# Patient Record
Sex: FEMALE | Race: OTHER | HISPANIC OR LATINO | Employment: FULL TIME | URBAN - METROPOLITAN AREA
[De-identification: names, ages, dates, MRNs, and addresses within clinical notes are randomized per-mention and may not be internally consistent; named-entity substitution may affect disease eponyms.]

---

## 2015-07-27 LAB — EXTERNAL HIV SCREEN: NORMAL

## 2017-03-05 ENCOUNTER — HOSPITAL ENCOUNTER (EMERGENCY)
Facility: HOSPITAL | Age: 27
Discharge: HOME | End: 2017-03-05
Attending: EMERGENCY MEDICINE | Admitting: EMERGENCY MEDICINE
Payer: COMMERCIAL

## 2017-03-05 VITALS
RESPIRATION RATE: 18 BRPM | HEART RATE: 90 BPM | SYSTOLIC BLOOD PRESSURE: 138 MMHG | DIASTOLIC BLOOD PRESSURE: 82 MMHG | TEMPERATURE: 98.2 F | WEIGHT: 175 LBS | OXYGEN SATURATION: 99 %

## 2017-03-05 DIAGNOSIS — N39.0 URINARY TRACT INFECTION: Primary | ICD-10-CM

## 2017-03-05 LAB
BACTERIA UR QL AUTO: ABNORMAL /HPF
BILIRUB UR QL STRIP: NEGATIVE
CLARITY UR: ABNORMAL
CLARITY, POC: ABNORMAL
COLOR UR: ABNORMAL
COLOR, POC: ABNORMAL
GLUCOSE UR STRIP-MCNC: NEGATIVE MG/DL
HCG UR QL: NEGATIVE
HGB UR QL STRIP.AUTO: ABNORMAL
KETONES UR STRIP-MCNC: ABNORMAL MG/DL
LEUKOCYTE ESTERASE UR QL STRIP: ABNORMAL
NITRITE UR QL STRIP: POSITIVE
NON-SQ EPI CELLS URNS QL MICRO: ABNORMAL /HPF
PH UR STRIP.AUTO: 5.5 [PH] (ref 4.5–8)
PROT UR STRIP-MCNC: ABNORMAL MG/DL
RBC #/AREA URNS AUTO: ABNORMAL /HPF
SP GR UR STRIP.AUTO: >=1.03 (ref 1–1.03)
UROBILINOGEN UR QL STRIP.AUTO: 0.2 E.U./DL
WBC #/AREA URNS AUTO: ABNORMAL /HPF

## 2017-03-05 PROCEDURE — 87186 SC STD MICRODIL/AGAR DIL: CPT

## 2017-03-05 PROCEDURE — 87077 CULTURE AEROBIC IDENTIFY: CPT

## 2017-03-05 PROCEDURE — 81001 URINALYSIS AUTO W/SCOPE: CPT

## 2017-03-05 PROCEDURE — 81025 URINE PREGNANCY TEST: CPT | Performed by: EMERGENCY MEDICINE

## 2017-03-05 PROCEDURE — 87086 URINE CULTURE/COLONY COUNT: CPT

## 2017-03-05 PROCEDURE — 81002 URINALYSIS NONAUTO W/O SCOPE: CPT | Performed by: EMERGENCY MEDICINE

## 2017-03-05 PROCEDURE — 99283 EMERGENCY DEPT VISIT LOW MDM: CPT

## 2017-03-05 RX ORDER — PHENAZOPYRIDINE HYDROCHLORIDE 95 MG/1
95 TABLET ORAL 3 TIMES DAILY PRN
Qty: 6 TABLET | Refills: 0 | Status: SHIPPED | OUTPATIENT
Start: 2017-03-05 | End: 2017-03-07

## 2017-03-05 RX ORDER — SULFAMETHOXAZOLE AND TRIMETHOPRIM 800; 160 MG/1; MG/1
1 TABLET ORAL 2 TIMES DAILY
Qty: 6 TABLET | Refills: 0 | Status: SHIPPED | OUTPATIENT
Start: 2017-03-05 | End: 2017-03-08

## 2017-03-07 LAB — BACTERIA UR CULT: NORMAL

## 2019-01-12 ENCOUNTER — HOSPITAL ENCOUNTER (EMERGENCY)
Facility: HOSPITAL | Age: 29
Discharge: HOME | End: 2019-01-12
Attending: EMERGENCY MEDICINE | Admitting: EMERGENCY MEDICINE
Payer: COMMERCIAL

## 2019-01-12 VITALS
OXYGEN SATURATION: 99 % | RESPIRATION RATE: 18 BRPM | HEART RATE: 89 BPM | SYSTOLIC BLOOD PRESSURE: 136 MMHG | WEIGHT: 190 LBS | BODY MASS INDEX: 35.87 KG/M2 | TEMPERATURE: 99.1 F | DIASTOLIC BLOOD PRESSURE: 79 MMHG | HEIGHT: 61 IN

## 2019-01-12 DIAGNOSIS — J02.0 STREP THROAT: Primary | ICD-10-CM

## 2019-01-12 PROCEDURE — 99282 EMERGENCY DEPT VISIT SF MDM: CPT

## 2019-01-12 RX ORDER — MEDROXYPROGESTERONE ACETATE 10 MG/1
10 TABLET ORAL
COMMUNITY
Start: 2018-11-09 | End: 2022-04-20 | Stop reason: ALTCHOICE

## 2019-01-12 RX ORDER — FLUCONAZOLE 200 MG/1
200 TABLET ORAL DAILY
Qty: 7 TABLET | Refills: 0 | Status: SHIPPED | OUTPATIENT
Start: 2019-01-12 | End: 2019-01-19

## 2019-01-12 RX ORDER — PENICILLIN V POTASSIUM 500 MG/1
500 TABLET ORAL 4 TIMES DAILY
Qty: 40 TABLET | Refills: 0 | Status: SHIPPED | OUTPATIENT
Start: 2019-01-12 | End: 2019-01-22

## 2019-01-12 RX ORDER — PENICILLIN V POTASSIUM 250 MG/1
500 TABLET ORAL ONCE
Status: COMPLETED | OUTPATIENT
Start: 2019-01-12 | End: 2019-01-12

## 2019-01-12 RX ORDER — ONDANSETRON 4 MG/1
4 TABLET, ORALLY DISINTEGRATING ORAL ONCE
Status: COMPLETED | OUTPATIENT
Start: 2019-01-12 | End: 2019-01-12

## 2019-01-12 RX ADMIN — PENICILLIN V POTASIUM 500 MG: 250 TABLET ORAL at 02:45

## 2019-01-12 RX ADMIN — ONDANSETRON 4 MG: 4 TABLET, ORALLY DISINTEGRATING ORAL at 02:56

## 2019-01-12 RX ADMIN — DEXAMETHASONE SODIUM PHOSPHATE 10 MG: 10 INJECTION, SOLUTION INTRAMUSCULAR; INTRAVENOUS at 02:45

## 2019-01-12 NOTE — ED PROVIDER NOTES
History  Chief Complaint   Patient presents with    Sore Throat     sore throat, started friday morning  Took tylenol at "[employer]  Previously healthy    Yesterday morning with ear ache, sore throat, now with pressure  no vomiting despite nausea - fever 101 5, No cough  "Feels like strep throat"   Nursing home exposure, no known sick contacts  Tylenol 3PM and 7:20, each 500mg - zero relief but brought down fever  Now with pressure behind the eyes and top of the head tightness/aching   bothered by light, no phonophobia, no neck stiffness, no vision change    Sexually active men condoms every time  No alcohol, no cigarettes, no drugs    Last period around xmas, always irregular  Provera for irregular periods                  Prior to Admission Medications   Prescriptions Last Dose Informant Patient Reported? Taking? medroxyPROGESTERone (PROVERA) 10 mg tablet   Yes Yes   Sig: 10 mg      Facility-Administered Medications: None       Past Medical History:   Diagnosis Date    UTI (urinary tract infection)        History reviewed  No pertinent surgical history  Family History   Problem Relation Age of Onset    Hypertension Mother     Hypertension Father      I have reviewed and agree with the history as documented  Social History   Substance Use Topics    Smoking status: Never Smoker    Smokeless tobacco: Never Used    Alcohol use No        Review of Systems   Constitutional: Positive for fever  Negative for activity change, appetite change, chills, diaphoresis and fatigue  HENT: Positive for ear pain, sore throat, trouble swallowing and voice change  Negative for congestion, rhinorrhea, sinus pain, sinus pressure and sneezing  Eyes: Negative for visual disturbance  Respiratory: Negative for choking, chest tightness, shortness of breath, wheezing and stridor  Cardiovascular: Negative for chest pain, palpitations and leg swelling  Gastrointestinal: Positive for nausea   Negative for abdominal distention, abdominal pain, blood in stool and vomiting  Endocrine: Negative for polyuria  Genitourinary: Negative for difficulty urinating, dyspareunia, dysuria, enuresis, flank pain, frequency, hematuria and vaginal bleeding  Musculoskeletal: Negative for arthralgias, gait problem, neck pain and neck stiffness  Skin: Positive for rash  Negative for color change  Allergic/Immunologic: Negative for food allergies  Neurological: Negative for dizziness, tremors, speech difficulty, weakness, light-headedness and headaches  Hematological: Positive for adenopathy  Psychiatric/Behavioral: Negative for confusion  The patient is not nervous/anxious  Physical Exam  ED Triage Vitals [01/12/19 0200]   Temperature Pulse Respirations Blood Pressure SpO2   99 1 °F (37 3 °C) 92 18 136/79 99 %      Temp src Heart Rate Source Patient Position - Orthostatic VS BP Location FiO2 (%)   -- -- -- -- --      Pain Score       8           Orthostatic Vital Signs  Vitals:    01/12/19 0200 01/12/19 0202   BP: 136/79    Pulse: 92 89       Physical Exam   Constitutional: She is oriented to person, place, and time  She appears well-developed and well-nourished  No distress  HENT:   Head: Normocephalic and atraumatic  Right Ear: External ear normal    Left Ear: External ear normal    Nose: Nose normal    Tonsillar exudate bilateral    Tender adenopathy   Eyes: Pupils are equal, round, and reactive to light  Conjunctivae and EOM are normal  No scleral icterus  Neck: Normal range of motion  Neck supple  Cardiovascular: Normal rate, regular rhythm, normal heart sounds and intact distal pulses  No murmur heard  Pulmonary/Chest: Effort normal and breath sounds normal  No stridor  No respiratory distress  She has no wheezes  She has no rales  Abdominal: Soft  Bowel sounds are normal  She exhibits no distension and no mass  There is no tenderness  There is no rebound and no guarding     Musculoskeletal: Normal range of motion  She exhibits no edema, tenderness or deformity  Lymphadenopathy:     She has cervical adenopathy  Neurological: She is alert and oriented to person, place, and time  No cranial nerve deficit or sensory deficit  Skin: Skin is warm and dry  Capillary refill takes less than 2 seconds  Rash (Left neck) noted  She is not diaphoretic  Psychiatric: She has a normal mood and affect  Her behavior is normal  Judgment and thought content normal    Nursing note and vitals reviewed  ED Medications  Medications   dexamethasone 10 mg/mL oral liquid 10 mg 1 mL (10 mg Oral Given 1/12/19 0245)   penicillin V potassium (VEETID) tablet 500 mg (500 mg Oral Given 1/12/19 0245)   ondansetron (ZOFRAN-ODT) dispersible tablet 4 mg (4 mg Oral Given 1/12/19 0256)       Diagnostic Studies  Results Reviewed     None                 No orders to display         Procedures  Procedures      Phone Consults  ED Phone Contact    ED Course                               MDM  Number of Diagnoses or Management Options  Strep throat:   Diagnosis management comments:  Patient with 4 centor criteria     10 days penicillin,  7 days Diflucan     1 dose corticosteroids in the ED   1 doses Zofran in the ED with symptom resolution    CritCare Time    Disposition  Final diagnoses:   Strep throat     Time reflects when diagnosis was documented in both MDM as applicable and the Disposition within this note     Time User Action Codes Description Comment    1/12/2019  3:01 AM Ananth Elena Add [J02 0] Strep throat       ED Disposition     ED Disposition Condition Comment    Discharge  Anisa Hard discharge to home/self care      Condition at discharge: Stable        Follow-up Information     Follow up With Specialties Details Raymundo Michael 70, SENDYNP Nurse Practitioner In 1 day As needed [phone]            Patient's Medications   Discharge Prescriptions    FLUCONAZOLE (DIFLUCAN) 200 MG TABLET    Take 1 tablet (200 mg total) by mouth daily for 7 days       Start Date: 1/12/2019 End Date: 1/19/2019       Order Dose: 200 mg       Quantity: 7 tablet    Refills: 0    PENICILLIN V POTASSIUM (VEETID) 500 MG TABLET    Take 1 tablet (500 mg total) by mouth 4 (four) times a day for 10 days       Start Date: 1/12/2019 End Date: 1/22/2019       Order Dose: 500 mg       Quantity: 40 tablet    Refills: 0     No discharge procedures on file  ED Provider  Attending physically available and evaluated Arben Blanco I managed the patient along with the ED Attending      Electronically Signed by         Stan Padilla MD  01/12/19 8607

## 2019-01-12 NOTE — DISCHARGE INSTRUCTIONS
Strep Throat, Ambulatory Care   GENERAL INFORMATION:   Strep throat  is a throat infection caused by bacteria  It is easily spread from person to person  Common symptoms include the following:   · Sore, red, and swollen throat    · Fever and headache     · Upset stomach, abdominal pain, or vomiting    · White or yellow patches or blisters in the back of your throat    · Tender, swollen lumps on the sides of your neck or jaw    · Throat pain when you swallow  Seek immediate care for the following symptoms:   · Throat pain that makes it too painful to drink fluids    · Drooling because you cannot swallow your spit    · Not able to open your mouth all the way or your voice is muffled    · Trouble breathing because your throat is swollen    · New symptoms like a bad headache, stiff neck, chest pain, or vomiting    · Blood in your urine and swollen face, feet, or hands  Treatment for strep throat:  You will need antibiotic medicine to treat your strep throat  Take your antibiotics until they are gone, even if you feel better  Do this unless your caregiver says it is okay to stop your antibiotics  You may return to work or school 24 hours after you start antibiotics  Manage strep throat:   · Do not smoke  If you smoke, it is never too late to quit  Smoking may make your symptoms worse  Ask for information if you need help quitting  · Drink juice, milk shakes, or soup  if your throat is too sore to eat solid food  Drinking liquids can also help prevent dehydration  · Gargle with salt water  Mix ¼ teaspoon salt in a glass of warm water and gargle  This may help reduce swelling in your throat  · Use lozenges, ice, soft foods, or popsicles  to soothe your throat    Prevent the spread of strep throat:   · Do not share food or drinks    · Wash your hands often    · Replace your toothbrush after you have taken antibiotics for 24 hours  Follow up with your healthcare provider as directed:  Write down your questions so you remember to ask them during your visits  CARE AGREEMENT:   You have the right to help plan your care  Learn about your health condition and how it may be treated  Discuss treatment options with your caregivers to decide what care you want to receive  You always have the right to refuse treatment  The above information is an  only  It is not intended as medical advice for individual conditions or treatments  Talk to your doctor, nurse or pharmacist before following any medical regimen to see if it is safe and effective for you  © 2014 [address] is for End User's use only and may not be sold, redistributed or otherwise used for commercial purposes  All illustrations and images included in CareNotes® are the copyrighted property of [employer]  or Tae Burdick

## 2019-01-12 NOTE — ED ATTENDING ATTESTATION
Rosales Mares MD, saw and evaluated the patient  I have discussed the patient with the resident/non-physician practitioner and agree with the resident's/non-physician practitioner's findings, Plan of Care, and MDM as documented in the resident's/non-physician practitioner's note, except where noted  All available labs and Radiology studies were reviewed  At this point I agree with the current assessment done in the Emergency Department  I have conducted an independent evaluation of this patient a history and physical is as follows:  1-2 days of sore throat and fever, fever to 101 at home  Patient with prior history of strep throat  Also complains of some tenderness in her neck radiating up into her ears which resulted in head congestion when she lies flat  No runny nose  Patient does not have cough  Patient does have change in her voice  Is otherwise healthy with no allergies and immunizations up-to-date  On exam patient has exudate of tonsillitis with tender adenopathy  She does not have significant trismus and there is no stridor  Her heart lung exams are normal   Impression:  Likely strep throat    Agree with documentation    Critical Care Time  CritCare Time    Procedures

## 2019-07-09 ENCOUNTER — OFFICE VISIT (OUTPATIENT)
Dept: FAMILY MEDICINE CLINIC | Facility: CLINIC | Age: 29
End: 2019-07-09
Payer: COMMERCIAL

## 2019-07-09 VITALS
BODY MASS INDEX: 35.89 KG/M2 | TEMPERATURE: 96.4 F | SYSTOLIC BLOOD PRESSURE: 118 MMHG | DIASTOLIC BLOOD PRESSURE: 90 MMHG | WEIGHT: 190.1 LBS | OXYGEN SATURATION: 96 % | HEIGHT: 61 IN | HEART RATE: 80 BPM

## 2019-07-09 DIAGNOSIS — R03.0 ELEVATED BLOOD-PRESSURE READING WITHOUT DIAGNOSIS OF HYPERTENSION: ICD-10-CM

## 2019-07-09 DIAGNOSIS — E66.09 CLASS 2 OBESITY DUE TO EXCESS CALORIES WITHOUT SERIOUS COMORBIDITY WITH BODY MASS INDEX (BMI) OF 35.0 TO 35.9 IN ADULT: ICD-10-CM

## 2019-07-09 DIAGNOSIS — F32.2 CURRENT SEVERE EPISODE OF MAJOR DEPRESSIVE DISORDER WITHOUT PSYCHOTIC FEATURES WITHOUT PRIOR EPISODE (HCC): Primary | ICD-10-CM

## 2019-07-09 DIAGNOSIS — E55.9 VITAMIN D DEFICIENCY: ICD-10-CM

## 2019-07-09 PROBLEM — R93.89 ULTRASOUND SCAN ABNORMAL: Status: ACTIVE | Noted: 2017-12-29

## 2019-07-09 PROCEDURE — 99203 OFFICE O/P NEW LOW 30 MIN: CPT | Performed by: NURSE PRACTITIONER

## 2019-07-09 RX ORDER — SERTRALINE HYDROCHLORIDE 25 MG/1
25 TABLET, FILM COATED ORAL
Qty: 30 TABLET | Refills: 1 | Status: SHIPPED | OUTPATIENT
Start: 2019-07-09 | End: 2019-07-30 | Stop reason: SDUPTHER

## 2019-07-09 NOTE — PROGRESS NOTES
Subjective:   Chief Complaint   Patient presents with    Allergies     right itchy, watery    Dizziness        Patient ID: Guillermo Cagle is a 29 y o  female  Presents today to reestablish care her last visit was October of 2015  She is also expressing concerns over increasing depression  While she has had some suicidal thoughts she has not developed up plan or thought a of a specific method  The following portions of the patient's history were reviewed and updated as appropriate: allergies, current medications, past family history, past medical history, past social history, past surgical history and problem list     Review of Systems   Constitutional: Positive for fatigue  Negative for chills and fever  Respiratory: Negative for cough, shortness of breath and wheezing  Cardiovascular: Negative for chest pain, palpitations and leg swelling  Psychiatric/Behavioral: Positive for dysphoric mood and suicidal ideas (without Plan or method)  Negative for agitation, decreased concentration, hallucinations and self-injury  The patient is nervous/anxious  Objective:  Vitals:    07/09/19 1102   BP: 118/90   BP Location: Left arm   Patient Position: Sitting   Cuff Size: Adult   Pulse: 80   Temp: (!) 96 4 °F (35 8 °C)   TempSrc: Temporal   SpO2: 96%   Weight: 86 2 kg (190 lb 1 6 oz)   Height: 5' 1" (1 549 m)      Physical Exam   Constitutional: She is oriented to person, place, and time  She appears well-developed and well-nourished  Eyes: Pupils are equal, round, and reactive to light  Conjunctivae and EOM are normal    Cardiovascular: Normal rate, regular rhythm, normal heart sounds and intact distal pulses  Pulmonary/Chest: Effort normal and breath sounds normal    Neurological: She is alert and oriented to person, place, and time  Skin: Skin is warm and dry  Psychiatric: Her speech is normal  She is withdrawn  Cognition and memory are normal  She exhibits a depressed mood   She expresses suicidal (has thought about it but no plan or method) ideation  She expresses no suicidal plans  Assessment/Plan:    No problem-specific Assessment & Plan notes found for this encounter  Diagnoses and all orders for this visit:    Current severe episode of major depressive disorder without psychotic features without prior episode (HCC)  -     sertraline (ZOLOFT) 25 mg tablet; Take 1 tablet (25 mg total) by mouth daily at bedtime  -     Ambulatory referral to Psychology; Future    Elevated blood-pressure reading without diagnosis of hypertension  -     CBC and differential; Future  -     Comprehensive metabolic panel; Future    Class 2 obesity due to excess calories without serious comorbidity with body mass index (BMI) of 35 0 to 35 9 in adult  -     Lipid panel; Future  -     TSH, 3rd generation with Free T4 reflex; Future    Vitamin D deficiency  -     Vitamin D 25 hydroxy;  Future

## 2019-07-09 NOTE — PATIENT INSTRUCTIONS
Depression   AMBULATORY CARE:   Depression  is a medical condition that causes feelings of sadness or hopelessness that do not go away  Depression may cause you to lose interest in things you used to enjoy  These feelings may interfere with your daily life  Common symptoms include the following:   · Appetite changes, or weight gain or loss    · Trouble going to sleep or staying asleep, or sleeping too much    · Fatigue or lack of energy    · Feeling restless, irritable, or withdrawn    · Feeling worthless, hopeless, discouraged, or guilty    · Trouble concentrating, remembering things, doing daily tasks, or making decisions    · Thoughts about hurting or killing yourself  Call 911 for any of the following:   · You think about harming yourself or someone else  Contact your healthcare provider if:   · Your symptoms do not improve  · You cannot make it to your next appointment  · You have new symptoms  · You have questions or concerns about your condition or care  Treatment for depression  may include medicine to improve or balance your mood  Therapy may also be used to treat your depression  A therapist will help you learn to cope with your thoughts and feelings  Therapy can be done alone or in a group  It may also be done with family members or a significant other  Self-care:   · Get regular physical activity  Try to exercise for 30 minutes, 3 to 5 days a week  Work with your healthcare provider to develop an exercise plan that you enjoy  Physical activity may improve your symptoms  · Get enough sleep  Create a routine to help you relax before bed  You can listen to music, read, or do yoga  Try to go to bed and wake up at the same time every day  Sleep is important for emotional health  · Eat a variety of healthy foods from all of the food groups  A healthy meal plan is low in fat, salt, and added sugar   Ask your healthcare provider for more information about a meal plan that is right for you      · Do not drink alcohol or use drugs  Alcohol and drugs can make your symptoms worse  Follow up with your healthcare provider as directed: Your healthcare provider will monitor your progress at follow-up visits  He or she will also monitor your medicine if you take antidepressants  Your healthcare provider will ask if the medicine is helping  Tell him or her about any side effects or problems you may have with your medicine  The type or amount of medicine may need to be changed  Write down your questions so you remember to ask them during your visits  © 2017 [address] Information is for End User's use only and may not be sold, redistributed or otherwise used for commercial purposes  All illustrations and images included in CareNotes® are the copyrighted property of A [employer]  or Tae Burdick  The above information is an  only  It is not intended as medical advice for individual conditions or treatments  Talk to your doctor, nurse or pharmacist before following any medical regimen to see if it is safe and effective for you

## 2019-07-19 ENCOUNTER — APPOINTMENT (OUTPATIENT)
Dept: LAB | Facility: HOSPITAL | Age: 29
End: 2019-07-19
Payer: COMMERCIAL

## 2019-07-19 DIAGNOSIS — E55.9 VITAMIN D DEFICIENCY: ICD-10-CM

## 2019-07-19 DIAGNOSIS — E66.09 CLASS 2 OBESITY DUE TO EXCESS CALORIES WITHOUT SERIOUS COMORBIDITY WITH BODY MASS INDEX (BMI) OF 35.0 TO 35.9 IN ADULT: ICD-10-CM

## 2019-07-19 DIAGNOSIS — R03.0 ELEVATED BLOOD-PRESSURE READING WITHOUT DIAGNOSIS OF HYPERTENSION: ICD-10-CM

## 2019-07-19 LAB
25(OH)D3 SERPL-MCNC: 19.8 NG/ML (ref 30–100)
ALBUMIN SERPL BCP-MCNC: 3.2 G/DL (ref 3.5–5)
ALP SERPL-CCNC: 65 U/L (ref 46–116)
ALT SERPL W P-5'-P-CCNC: 17 U/L (ref 12–78)
ANION GAP SERPL CALCULATED.3IONS-SCNC: 3 MMOL/L (ref 4–13)
AST SERPL W P-5'-P-CCNC: 12 U/L (ref 5–45)
BASOPHILS # BLD AUTO: 0.09 THOUSANDS/ΜL (ref 0–0.1)
BASOPHILS NFR BLD AUTO: 1 % (ref 0–1)
BILIRUB SERPL-MCNC: 0.57 MG/DL (ref 0.2–1)
BUN SERPL-MCNC: 11 MG/DL (ref 5–25)
CALCIUM SERPL-MCNC: 8.6 MG/DL (ref 8.3–10.1)
CHLORIDE SERPL-SCNC: 110 MMOL/L (ref 100–108)
CHOLEST SERPL-MCNC: 144 MG/DL (ref 50–200)
CO2 SERPL-SCNC: 26 MMOL/L (ref 21–32)
CREAT SERPL-MCNC: 0.71 MG/DL (ref 0.6–1.3)
EOSINOPHIL # BLD AUTO: 0.14 THOUSAND/ΜL (ref 0–0.61)
EOSINOPHIL NFR BLD AUTO: 2 % (ref 0–6)
ERYTHROCYTE [DISTWIDTH] IN BLOOD BY AUTOMATED COUNT: 12 % (ref 11.6–15.1)
GFR SERPL CREATININE-BSD FRML MDRD: 116 ML/MIN/1.73SQ M
GLUCOSE P FAST SERPL-MCNC: 91 MG/DL (ref 65–99)
HCT VFR BLD AUTO: 43.6 % (ref 34.8–46.1)
HDLC SERPL-MCNC: 46 MG/DL (ref 40–60)
HGB BLD-MCNC: 14.4 G/DL (ref 11.5–15.4)
IMM GRANULOCYTES # BLD AUTO: 0.03 THOUSAND/UL (ref 0–0.2)
IMM GRANULOCYTES NFR BLD AUTO: 1 % (ref 0–2)
LDLC SERPL CALC-MCNC: 75 MG/DL (ref 0–100)
LYMPHOCYTES # BLD AUTO: 1.56 THOUSANDS/ΜL (ref 0.6–4.47)
LYMPHOCYTES NFR BLD AUTO: 24 % (ref 14–44)
MCH RBC QN AUTO: 30.4 PG (ref 26.8–34.3)
MCHC RBC AUTO-ENTMCNC: 33 G/DL (ref 31.4–37.4)
MCV RBC AUTO: 92 FL (ref 82–98)
MONOCYTES # BLD AUTO: 0.44 THOUSAND/ΜL (ref 0.17–1.22)
MONOCYTES NFR BLD AUTO: 7 % (ref 4–12)
NEUTROPHILS # BLD AUTO: 4.35 THOUSANDS/ΜL (ref 1.85–7.62)
NEUTS SEG NFR BLD AUTO: 65 % (ref 43–75)
NONHDLC SERPL-MCNC: 98 MG/DL
NRBC BLD AUTO-RTO: 0 /100 WBCS
PLATELET # BLD AUTO: 327 THOUSANDS/UL (ref 149–390)
PMV BLD AUTO: 12 FL (ref 8.9–12.7)
POTASSIUM SERPL-SCNC: 3.7 MMOL/L (ref 3.5–5.3)
PROT SERPL-MCNC: 6.9 G/DL (ref 6.4–8.2)
RBC # BLD AUTO: 4.74 MILLION/UL (ref 3.81–5.12)
SODIUM SERPL-SCNC: 139 MMOL/L (ref 136–145)
TRIGL SERPL-MCNC: 117 MG/DL
TSH SERPL DL<=0.05 MIU/L-ACNC: 0.69 UIU/ML (ref 0.36–3.74)
WBC # BLD AUTO: 6.61 THOUSAND/UL (ref 4.31–10.16)

## 2019-07-19 PROCEDURE — 36415 COLL VENOUS BLD VENIPUNCTURE: CPT

## 2019-07-19 PROCEDURE — 85025 COMPLETE CBC W/AUTO DIFF WBC: CPT

## 2019-07-19 PROCEDURE — 84443 ASSAY THYROID STIM HORMONE: CPT

## 2019-07-19 PROCEDURE — 80061 LIPID PANEL: CPT

## 2019-07-19 PROCEDURE — 80053 COMPREHEN METABOLIC PANEL: CPT

## 2019-07-19 PROCEDURE — 82306 VITAMIN D 25 HYDROXY: CPT

## 2019-07-29 RX ORDER — SUMATRIPTAN 25 MG/1
TABLET, FILM COATED ORAL
COMMUNITY
Start: 2015-10-07 | End: 2020-10-27 | Stop reason: SDUPTHER

## 2019-07-30 ENCOUNTER — OFFICE VISIT (OUTPATIENT)
Dept: FAMILY MEDICINE CLINIC | Facility: CLINIC | Age: 29
End: 2019-07-30
Payer: COMMERCIAL

## 2019-07-30 VITALS
BODY MASS INDEX: 36.97 KG/M2 | HEART RATE: 91 BPM | TEMPERATURE: 97.5 F | DIASTOLIC BLOOD PRESSURE: 78 MMHG | HEIGHT: 61 IN | OXYGEN SATURATION: 98 % | RESPIRATION RATE: 18 BRPM | WEIGHT: 195.8 LBS | SYSTOLIC BLOOD PRESSURE: 122 MMHG

## 2019-07-30 DIAGNOSIS — F32.2 CURRENT SEVERE EPISODE OF MAJOR DEPRESSIVE DISORDER WITHOUT PSYCHOTIC FEATURES WITHOUT PRIOR EPISODE (HCC): Primary | ICD-10-CM

## 2019-07-30 DIAGNOSIS — F32.2 CURRENT SEVERE EPISODE OF MAJOR DEPRESSIVE DISORDER WITHOUT PSYCHOTIC FEATURES WITHOUT PRIOR EPISODE (HCC): ICD-10-CM

## 2019-07-30 PROBLEM — F32.A DEPRESSION: Status: ACTIVE | Noted: 2019-07-30

## 2019-07-30 PROCEDURE — 3008F BODY MASS INDEX DOCD: CPT | Performed by: NURSE PRACTITIONER

## 2019-07-30 PROCEDURE — 99213 OFFICE O/P EST LOW 20 MIN: CPT | Performed by: NURSE PRACTITIONER

## 2019-07-30 PROCEDURE — 1036F TOBACCO NON-USER: CPT | Performed by: NURSE PRACTITIONER

## 2019-07-30 RX ORDER — SERTRALINE HYDROCHLORIDE 25 MG/1
25 TABLET, FILM COATED ORAL
Qty: 30 TABLET | Refills: 3 | Status: SHIPPED | OUTPATIENT
Start: 2019-07-30 | End: 2020-10-27

## 2019-07-30 NOTE — PROGRESS NOTES
Subjective:   Chief Complaint   Patient presents with    Follow-up     Depression         Patient ID: Micah Real is a 29 y o  female  Presents today for 3 week follow-up for depression  She has yet to be able to find a therapist but has made phone calls to words that end  States she has been feeling well engaging in normal activities as well as socializing with family and friends  Her mother has remarked that she seems more like herself these days  The Zoloft is working well and will continue  Encouraged to find therapist to help as well  Cut      The following portions of the patient's history were reviewed and updated as appropriate: allergies, current medications, past family history, past medical history, past social history, past surgical history and problem list     Review of Systems   Constitutional: Negative for chills, fatigue and fever  Respiratory: Negative for cough and shortness of breath  Cardiovascular: Negative for chest pain and palpitations  Psychiatric/Behavioral: Negative for decreased concentration, dysphoric mood, self-injury and suicidal ideas  The patient is not nervous/anxious  Objective:  Vitals:    07/30/19 1546   BP: 122/78   BP Location: Left arm   Patient Position: Sitting   Cuff Size: Large   Pulse: 91   Resp: 18   Temp: 97 5 °F (36 4 °C)   TempSrc: Temporal   SpO2: 98%   Weight: 88 8 kg (195 lb 12 8 oz)   Height: 5' 1" (1 549 m)      Physical Exam   Constitutional: She is oriented to person, place, and time  She appears well-developed and well-nourished  Neck: Normal range of motion  Neck supple  Cardiovascular: Normal rate, regular rhythm, normal heart sounds and intact distal pulses  Pulmonary/Chest: Effort normal and breath sounds normal    Lymphadenopathy:     She has no cervical adenopathy  Neurological: She is alert and oriented to person, place, and time  Skin: Skin is warm and dry  Psychiatric: She has a normal mood and affect  Her speech is normal and behavior is normal  Thought content normal          Assessment/Plan:    No problem-specific Assessment & Plan notes found for this encounter  Diagnoses and all orders for this visit:    Current severe episode of major depressive disorder without psychotic features without prior episode ([facility] [address] )  Comments:  Continue on current dose of Zoloft continue to try to get a therapy appointment  Orders:  -     sertraline (ZOLOFT) 25 mg tablet; Take 1 tablet (25 mg total) by mouth daily at bedtime    Current severe episode of major depressive disorder without psychotic features without prior episode ([facility])  -     sertraline (ZOLOFT) 25 mg tablet;  Take 1 tablet (25 mg total) by mouth daily at bedtime

## 2020-04-08 ENCOUNTER — TELEMEDICINE (OUTPATIENT)
Dept: FAMILY MEDICINE CLINIC | Facility: CLINIC | Age: 30
End: 2020-04-08
Payer: COMMERCIAL

## 2020-04-08 ENCOUNTER — TELEPHONE (OUTPATIENT)
Dept: FAMILY MEDICINE CLINIC | Facility: CLINIC | Age: 30
End: 2020-04-08

## 2020-04-08 VITALS — TEMPERATURE: 99.6 F

## 2020-04-08 DIAGNOSIS — Z20.828 EXPOSURE TO SARS-ASSOCIATED CORONAVIRUS: ICD-10-CM

## 2020-04-08 DIAGNOSIS — Z20.822 EXPOSURE TO COVID-19 VIRUS: ICD-10-CM

## 2020-04-08 DIAGNOSIS — Z20.828 EXPOSURE TO SARS-ASSOCIATED CORONAVIRUS: Primary | ICD-10-CM

## 2020-04-08 PROCEDURE — 99213 OFFICE O/P EST LOW 20 MIN: CPT | Performed by: NURSE PRACTITIONER

## 2020-04-08 PROCEDURE — 87635 SARS-COV-2 COVID-19 AMP PRB: CPT

## 2020-04-08 RX ORDER — METHOCARBAMOL 750 MG/1
50000 TABLET ORAL WEEKLY
COMMUNITY
Start: 2020-01-07 | End: 2022-04-20 | Stop reason: ALTCHOICE

## 2020-04-11 ENCOUNTER — TELEPHONE (OUTPATIENT)
Dept: FAMILY MEDICINE CLINIC | Facility: CLINIC | Age: 30
End: 2020-04-11

## 2020-04-11 LAB — SARS-COV-2 RNA SPEC QL NAA+PROBE: NOT DETECTED

## 2020-04-29 ENCOUNTER — TELEMEDICINE (OUTPATIENT)
Dept: FAMILY MEDICINE CLINIC | Facility: CLINIC | Age: 30
End: 2020-04-29
Payer: COMMERCIAL

## 2020-04-29 ENCOUNTER — TELEPHONE (OUTPATIENT)
Dept: FAMILY MEDICINE CLINIC | Facility: CLINIC | Age: 30
End: 2020-04-29

## 2020-04-29 DIAGNOSIS — Z20.828 EXPOSURE TO SARS-ASSOCIATED CORONAVIRUS: ICD-10-CM

## 2020-04-29 DIAGNOSIS — Z20.828 EXPOSURE TO SARS-ASSOCIATED CORONAVIRUS: Primary | ICD-10-CM

## 2020-04-29 PROCEDURE — U0003 INFECTIOUS AGENT DETECTION BY NUCLEIC ACID (DNA OR RNA); SEVERE ACUTE RESPIRATORY SYNDROME CORONAVIRUS 2 (SARS-COV-2) (CORONAVIRUS DISEASE [COVID-19]), AMPLIFIED PROBE TECHNIQUE, MAKING USE OF HIGH THROUGHPUT TECHNOLOGIES AS DESCRIBED BY CMS-2020-01-R: HCPCS

## 2020-04-29 PROCEDURE — 99213 OFFICE O/P EST LOW 20 MIN: CPT | Performed by: NURSE PRACTITIONER

## 2020-04-30 ENCOUNTER — TELEPHONE (OUTPATIENT)
Dept: FAMILY MEDICINE CLINIC | Facility: CLINIC | Age: 30
End: 2020-04-30

## 2020-04-30 ENCOUNTER — TELEMEDICINE (OUTPATIENT)
Dept: FAMILY MEDICINE CLINIC | Facility: CLINIC | Age: 30
End: 2020-04-30
Payer: COMMERCIAL

## 2020-04-30 VITALS — TEMPERATURE: 99.1 F

## 2020-04-30 DIAGNOSIS — U07.1 COVID-19 VIRUS INFECTION: Primary | ICD-10-CM

## 2020-04-30 LAB — SARS-COV-2 RNA SPEC QL NAA+PROBE: DETECTED

## 2020-04-30 PROCEDURE — 99213 OFFICE O/P EST LOW 20 MIN: CPT | Performed by: NURSE PRACTITIONER

## 2020-05-01 ENCOUNTER — TELEMEDICINE (OUTPATIENT)
Dept: FAMILY MEDICINE CLINIC | Facility: CLINIC | Age: 30
End: 2020-05-01
Payer: COMMERCIAL

## 2020-05-01 DIAGNOSIS — U07.1 COVID-19 VIRUS INFECTION: Primary | ICD-10-CM

## 2020-05-01 PROCEDURE — 99213 OFFICE O/P EST LOW 20 MIN: CPT | Performed by: NURSE PRACTITIONER

## 2020-05-02 ENCOUNTER — TELEMEDICINE (OUTPATIENT)
Dept: FAMILY MEDICINE CLINIC | Facility: CLINIC | Age: 30
End: 2020-05-02
Payer: COMMERCIAL

## 2020-05-02 DIAGNOSIS — U07.1 COVID-19 VIRUS INFECTION: Primary | ICD-10-CM

## 2020-05-02 PROCEDURE — 99213 OFFICE O/P EST LOW 20 MIN: CPT | Performed by: NURSE PRACTITIONER

## 2020-05-03 ENCOUNTER — TELEMEDICINE (OUTPATIENT)
Dept: FAMILY MEDICINE CLINIC | Facility: CLINIC | Age: 30
End: 2020-05-03
Payer: COMMERCIAL

## 2020-05-03 DIAGNOSIS — U07.1 COVID-19 VIRUS INFECTION: Primary | ICD-10-CM

## 2020-05-03 PROCEDURE — 99213 OFFICE O/P EST LOW 20 MIN: CPT | Performed by: NURSE PRACTITIONER

## 2020-05-04 ENCOUNTER — TELEMEDICINE (OUTPATIENT)
Dept: FAMILY MEDICINE CLINIC | Facility: CLINIC | Age: 30
End: 2020-05-04
Payer: COMMERCIAL

## 2020-05-04 VITALS — TEMPERATURE: 99.3 F

## 2020-05-04 DIAGNOSIS — U07.1 COVID-19 VIRUS INFECTION: ICD-10-CM

## 2020-05-04 PROCEDURE — 99213 OFFICE O/P EST LOW 20 MIN: CPT | Performed by: NURSE PRACTITIONER

## 2020-05-05 ENCOUNTER — TELEMEDICINE (OUTPATIENT)
Dept: FAMILY MEDICINE CLINIC | Facility: CLINIC | Age: 30
End: 2020-05-05
Payer: COMMERCIAL

## 2020-05-05 VITALS — TEMPERATURE: 99.8 F

## 2020-05-05 DIAGNOSIS — U07.1 COVID-19 VIRUS INFECTION: Primary | ICD-10-CM

## 2020-05-05 PROCEDURE — 99213 OFFICE O/P EST LOW 20 MIN: CPT | Performed by: NURSE PRACTITIONER

## 2020-05-06 ENCOUNTER — TELEMEDICINE (OUTPATIENT)
Dept: FAMILY MEDICINE CLINIC | Facility: CLINIC | Age: 30
End: 2020-05-06
Payer: COMMERCIAL

## 2020-05-06 DIAGNOSIS — U07.1 COVID-19 VIRUS INFECTION: ICD-10-CM

## 2020-05-06 PROCEDURE — 99213 OFFICE O/P EST LOW 20 MIN: CPT | Performed by: NURSE PRACTITIONER

## 2020-05-07 ENCOUNTER — TELEMEDICINE (OUTPATIENT)
Dept: FAMILY MEDICINE CLINIC | Facility: CLINIC | Age: 30
End: 2020-05-07
Payer: COMMERCIAL

## 2020-05-07 ENCOUNTER — TELEPHONE (OUTPATIENT)
Dept: FAMILY MEDICINE CLINIC | Facility: CLINIC | Age: 30
End: 2020-05-07

## 2020-05-07 VITALS — TEMPERATURE: 99.1 F

## 2020-05-07 DIAGNOSIS — J45.40 MODERATE PERSISTENT ASTHMA WITHOUT COMPLICATION: ICD-10-CM

## 2020-05-07 DIAGNOSIS — U07.1 COVID-19 VIRUS INFECTION: ICD-10-CM

## 2020-05-07 PROCEDURE — 99213 OFFICE O/P EST LOW 20 MIN: CPT | Performed by: NURSE PRACTITIONER

## 2020-05-08 ENCOUNTER — TELEMEDICINE (OUTPATIENT)
Dept: FAMILY MEDICINE CLINIC | Facility: CLINIC | Age: 30
End: 2020-05-08
Payer: COMMERCIAL

## 2020-05-08 DIAGNOSIS — U07.1 COVID-19 VIRUS INFECTION: ICD-10-CM

## 2020-05-08 PROCEDURE — 99213 OFFICE O/P EST LOW 20 MIN: CPT | Performed by: NURSE PRACTITIONER

## 2020-05-08 RX ORDER — PREDNISONE 20 MG/1
20 TABLET ORAL 2 TIMES DAILY WITH MEALS
Qty: 10 TABLET | Refills: 0 | Status: SHIPPED | OUTPATIENT
Start: 2020-05-08 | End: 2020-05-13

## 2020-05-09 ENCOUNTER — TELEMEDICINE (OUTPATIENT)
Dept: FAMILY MEDICINE CLINIC | Facility: CLINIC | Age: 30
End: 2020-05-09
Payer: COMMERCIAL

## 2020-05-09 DIAGNOSIS — U07.1 COVID-19 VIRUS INFECTION: Primary | ICD-10-CM

## 2020-05-09 PROCEDURE — 99213 OFFICE O/P EST LOW 20 MIN: CPT | Performed by: NURSE PRACTITIONER

## 2020-05-11 ENCOUNTER — TELEMEDICINE (OUTPATIENT)
Dept: FAMILY MEDICINE CLINIC | Facility: CLINIC | Age: 30
End: 2020-05-11
Payer: COMMERCIAL

## 2020-05-11 DIAGNOSIS — U07.1 COVID-19 VIRUS INFECTION: ICD-10-CM

## 2020-05-11 PROCEDURE — 99213 OFFICE O/P EST LOW 20 MIN: CPT | Performed by: NURSE PRACTITIONER

## 2020-05-13 ENCOUNTER — TELEPHONE (OUTPATIENT)
Dept: FAMILY MEDICINE CLINIC | Facility: CLINIC | Age: 30
End: 2020-05-13

## 2020-10-27 ENCOUNTER — OFFICE VISIT (OUTPATIENT)
Dept: FAMILY MEDICINE CLINIC | Facility: CLINIC | Age: 30
End: 2020-10-27
Payer: COMMERCIAL

## 2020-10-27 VITALS
BODY MASS INDEX: 37.98 KG/M2 | HEART RATE: 93 BPM | OXYGEN SATURATION: 99 % | HEIGHT: 62 IN | TEMPERATURE: 98.2 F | WEIGHT: 206.4 LBS | DIASTOLIC BLOOD PRESSURE: 72 MMHG | SYSTOLIC BLOOD PRESSURE: 108 MMHG

## 2020-10-27 DIAGNOSIS — Z00.00 WELL ADULT EXAM: ICD-10-CM

## 2020-10-27 DIAGNOSIS — Z11.4 SCREENING FOR HIV (HUMAN IMMUNODEFICIENCY VIRUS): ICD-10-CM

## 2020-10-27 DIAGNOSIS — E55.9 VITAMIN D DEFICIENCY: ICD-10-CM

## 2020-10-27 DIAGNOSIS — N91.1 AMENORRHEA, SECONDARY: ICD-10-CM

## 2020-10-27 DIAGNOSIS — F32.0 CURRENT MILD EPISODE OF MAJOR DEPRESSIVE DISORDER, UNSPECIFIED WHETHER RECURRENT (HCC): ICD-10-CM

## 2020-10-27 DIAGNOSIS — G43.009 MIGRAINE WITHOUT AURA AND WITHOUT STATUS MIGRAINOSUS, NOT INTRACTABLE: ICD-10-CM

## 2020-10-27 DIAGNOSIS — Z23 ENCOUNTER FOR IMMUNIZATION: Primary | ICD-10-CM

## 2020-10-27 LAB
25(OH)D3 SERPL-MCNC: 29.4 NG/ML (ref 30–100)
ALBUMIN SERPL BCP-MCNC: 3.8 G/DL (ref 3.5–5)
ALP SERPL-CCNC: 75 U/L (ref 46–116)
ALT SERPL W P-5'-P-CCNC: 22 U/L (ref 12–78)
ANION GAP SERPL CALCULATED.3IONS-SCNC: 3 MMOL/L (ref 4–13)
AST SERPL W P-5'-P-CCNC: 14 U/L (ref 5–45)
BASOPHILS # BLD AUTO: 0.1 THOUSANDS/ΜL (ref 0–0.1)
BASOPHILS NFR BLD AUTO: 1 % (ref 0–1)
BILIRUB SERPL-MCNC: 0.48 MG/DL (ref 0.2–1)
BUN SERPL-MCNC: 13 MG/DL (ref 5–25)
CALCIUM SERPL-MCNC: 9.1 MG/DL (ref 8.3–10.1)
CHLORIDE SERPL-SCNC: 109 MMOL/L (ref 100–108)
CHOLEST SERPL-MCNC: 172 MG/DL (ref 50–200)
CO2 SERPL-SCNC: 29 MMOL/L (ref 21–32)
CREAT SERPL-MCNC: 0.7 MG/DL (ref 0.6–1.3)
EOSINOPHIL # BLD AUTO: 0.15 THOUSAND/ΜL (ref 0–0.61)
EOSINOPHIL NFR BLD AUTO: 2 % (ref 0–6)
ERYTHROCYTE [DISTWIDTH] IN BLOOD BY AUTOMATED COUNT: 12.3 % (ref 11.6–15.1)
EST. AVERAGE GLUCOSE BLD GHB EST-MCNC: 100 MG/DL
GFR SERPL CREATININE-BSD FRML MDRD: 117 ML/MIN/1.73SQ M
GLUCOSE SERPL-MCNC: 66 MG/DL (ref 65–140)
HBA1C MFR BLD: 5.1 %
HCT VFR BLD AUTO: 46.8 % (ref 34.8–46.1)
HDLC SERPL-MCNC: 62 MG/DL
HGB BLD-MCNC: 15.8 G/DL (ref 11.5–15.4)
IMM GRANULOCYTES # BLD AUTO: 0.03 THOUSAND/UL (ref 0–0.2)
IMM GRANULOCYTES NFR BLD AUTO: 0 % (ref 0–2)
LDLC SERPL CALC-MCNC: 92 MG/DL (ref 0–100)
LYMPHOCYTES # BLD AUTO: 1.75 THOUSANDS/ΜL (ref 0.6–4.47)
LYMPHOCYTES NFR BLD AUTO: 24 % (ref 14–44)
MCH RBC QN AUTO: 30.7 PG (ref 26.8–34.3)
MCHC RBC AUTO-ENTMCNC: 33.8 G/DL (ref 31.4–37.4)
MCV RBC AUTO: 91 FL (ref 82–98)
MONOCYTES # BLD AUTO: 0.41 THOUSAND/ΜL (ref 0.17–1.22)
MONOCYTES NFR BLD AUTO: 6 % (ref 4–12)
NEUTROPHILS # BLD AUTO: 4.9 THOUSANDS/ΜL (ref 1.85–7.62)
NEUTS SEG NFR BLD AUTO: 67 % (ref 43–75)
NONHDLC SERPL-MCNC: 110 MG/DL
NRBC BLD AUTO-RTO: 0 /100 WBCS
PLATELET # BLD AUTO: 374 THOUSANDS/UL (ref 149–390)
PMV BLD AUTO: 11.9 FL (ref 8.9–12.7)
POTASSIUM SERPL-SCNC: 4 MMOL/L (ref 3.5–5.3)
PROT SERPL-MCNC: 7.6 G/DL (ref 6.4–8.2)
RBC # BLD AUTO: 5.15 MILLION/UL (ref 3.81–5.12)
SL AMB POCT URINE HCG: NEGATIVE
SODIUM SERPL-SCNC: 141 MMOL/L (ref 136–145)
TRIGL SERPL-MCNC: 89 MG/DL
TSH SERPL DL<=0.05 MIU/L-ACNC: 0.9 UIU/ML (ref 0.36–3.74)
WBC # BLD AUTO: 7.34 THOUSAND/UL (ref 4.31–10.16)

## 2020-10-27 PROCEDURE — 82306 VITAMIN D 25 HYDROXY: CPT | Performed by: NURSE PRACTITIONER

## 2020-10-27 PROCEDURE — 85025 COMPLETE CBC W/AUTO DIFF WBC: CPT | Performed by: NURSE PRACTITIONER

## 2020-10-27 PROCEDURE — 90715 TDAP VACCINE 7 YRS/> IM: CPT

## 2020-10-27 PROCEDURE — 99395 PREV VISIT EST AGE 18-39: CPT | Performed by: NURSE PRACTITIONER

## 2020-10-27 PROCEDURE — 81025 URINE PREGNANCY TEST: CPT | Performed by: NURSE PRACTITIONER

## 2020-10-27 PROCEDURE — 3008F BODY MASS INDEX DOCD: CPT | Performed by: NURSE PRACTITIONER

## 2020-10-27 PROCEDURE — 36415 COLL VENOUS BLD VENIPUNCTURE: CPT | Performed by: NURSE PRACTITIONER

## 2020-10-27 PROCEDURE — 80061 LIPID PANEL: CPT | Performed by: NURSE PRACTITIONER

## 2020-10-27 PROCEDURE — 90471 IMMUNIZATION ADMIN: CPT

## 2020-10-27 PROCEDURE — 83036 HEMOGLOBIN GLYCOSYLATED A1C: CPT | Performed by: NURSE PRACTITIONER

## 2020-10-27 PROCEDURE — 3725F SCREEN DEPRESSION PERFORMED: CPT | Performed by: NURSE PRACTITIONER

## 2020-10-27 PROCEDURE — 80053 COMPREHEN METABOLIC PANEL: CPT | Performed by: NURSE PRACTITIONER

## 2020-10-27 PROCEDURE — 84443 ASSAY THYROID STIM HORMONE: CPT | Performed by: NURSE PRACTITIONER

## 2020-10-27 PROCEDURE — 87389 HIV-1 AG W/HIV-1&-2 AB AG IA: CPT | Performed by: NURSE PRACTITIONER

## 2020-10-27 RX ORDER — SUMATRIPTAN 25 MG/1
25 TABLET, FILM COATED ORAL ONCE AS NEEDED
Qty: 10 TABLET | Refills: 1 | Status: SHIPPED | OUTPATIENT
Start: 2020-10-27 | End: 2022-04-20 | Stop reason: ALTCHOICE

## 2020-10-28 ENCOUNTER — TELEPHONE (OUTPATIENT)
Dept: ADMINISTRATIVE | Facility: OTHER | Age: 30
End: 2020-10-28

## 2020-10-28 DIAGNOSIS — R79.89 ABNORMAL CBC: Primary | ICD-10-CM

## 2020-10-28 LAB — HIV 1+2 AB+HIV1 P24 AG SERPL QL IA: NORMAL

## 2020-11-14 ENCOUNTER — LAB (OUTPATIENT)
Dept: LAB | Facility: HOSPITAL | Age: 30
End: 2020-11-14
Payer: COMMERCIAL

## 2020-11-14 DIAGNOSIS — R79.89 ABNORMAL CBC: ICD-10-CM

## 2020-11-14 LAB
BASOPHILS # BLD AUTO: 0.08 THOUSANDS/ΜL (ref 0–0.1)
BASOPHILS NFR BLD AUTO: 1 % (ref 0–1)
EOSINOPHIL # BLD AUTO: 0.1 THOUSAND/ΜL (ref 0–0.61)
EOSINOPHIL NFR BLD AUTO: 1 % (ref 0–6)
ERYTHROCYTE [DISTWIDTH] IN BLOOD BY AUTOMATED COUNT: 12.3 % (ref 11.6–15.1)
HCT VFR BLD AUTO: 45 % (ref 34.8–46.1)
HGB BLD-MCNC: 14.5 G/DL (ref 11.5–15.4)
IMM GRANULOCYTES # BLD AUTO: 0.01 THOUSAND/UL (ref 0–0.2)
IMM GRANULOCYTES NFR BLD AUTO: 0 % (ref 0–2)
LYMPHOCYTES # BLD AUTO: 1.83 THOUSANDS/ΜL (ref 0.6–4.47)
LYMPHOCYTES NFR BLD AUTO: 25 % (ref 14–44)
MCH RBC QN AUTO: 29.8 PG (ref 26.8–34.3)
MCHC RBC AUTO-ENTMCNC: 32.2 G/DL (ref 31.4–37.4)
MCV RBC AUTO: 92 FL (ref 82–98)
MONOCYTES # BLD AUTO: 0.52 THOUSAND/ΜL (ref 0.17–1.22)
MONOCYTES NFR BLD AUTO: 7 % (ref 4–12)
NEUTROPHILS # BLD AUTO: 4.8 THOUSANDS/ΜL (ref 1.85–7.62)
NEUTS SEG NFR BLD AUTO: 66 % (ref 43–75)
NRBC BLD AUTO-RTO: 0 /100 WBCS
PLATELET # BLD AUTO: 331 THOUSANDS/UL (ref 149–390)
PMV BLD AUTO: 12.1 FL (ref 8.9–12.7)
RBC # BLD AUTO: 4.87 MILLION/UL (ref 3.81–5.12)
WBC # BLD AUTO: 7.34 THOUSAND/UL (ref 4.31–10.16)

## 2020-11-14 PROCEDURE — 85025 COMPLETE CBC W/AUTO DIFF WBC: CPT

## 2020-11-14 PROCEDURE — 36415 COLL VENOUS BLD VENIPUNCTURE: CPT

## 2021-02-09 ENCOUNTER — OFFICE VISIT (OUTPATIENT)
Dept: FAMILY MEDICINE CLINIC | Facility: CLINIC | Age: 31
End: 2021-02-09
Payer: COMMERCIAL

## 2021-02-09 VITALS
HEART RATE: 84 BPM | OXYGEN SATURATION: 98 % | TEMPERATURE: 97.5 F | WEIGHT: 213.7 LBS | RESPIRATION RATE: 18 BRPM | DIASTOLIC BLOOD PRESSURE: 60 MMHG | BODY MASS INDEX: 40.35 KG/M2 | HEIGHT: 61 IN | SYSTOLIC BLOOD PRESSURE: 112 MMHG

## 2021-02-09 DIAGNOSIS — M79.645 FINGER PAIN, LEFT: Primary | ICD-10-CM

## 2021-02-09 DIAGNOSIS — F41.9 ANXIETY: ICD-10-CM

## 2021-02-09 DIAGNOSIS — F32.0 CURRENT MILD EPISODE OF MAJOR DEPRESSIVE DISORDER, UNSPECIFIED WHETHER RECURRENT (HCC): ICD-10-CM

## 2021-02-09 PROCEDURE — 3008F BODY MASS INDEX DOCD: CPT | Performed by: NURSE PRACTITIONER

## 2021-02-09 PROCEDURE — 1036F TOBACCO NON-USER: CPT | Performed by: NURSE PRACTITIONER

## 2021-02-09 PROCEDURE — 99214 OFFICE O/P EST MOD 30 MIN: CPT | Performed by: NURSE PRACTITIONER

## 2021-02-09 RX ORDER — SERTRALINE HYDROCHLORIDE 100 MG/1
100 TABLET, FILM COATED ORAL DAILY
Qty: 90 TABLET | Refills: 1 | Status: SHIPPED | OUTPATIENT
Start: 2021-02-09 | End: 2022-04-20 | Stop reason: ALTCHOICE

## 2021-02-09 NOTE — PROGRESS NOTES
Assessment/Plan:         Diagnoses and all orders for this visit:    Finger pain, left  -     XR hand 3+ vw left; Future    Current mild episode of major depressive disorder, unspecified whether recurrent (HCC)  -     sertraline (ZOLOFT) 100 mg tablet; Take 1 tablet (100 mg total) by mouth daily    Anxiety          Subjective:      Patient ID: Lucy Quezada is a 27 y o  female  HPI  Migraines seem to be better     Not using up her monthly imitrex  But noticing getting other type of headaches behind her eyes   More pressure   Feels like getting punched from inside out  Getting the other kind of headache and getting every other weekend when working  Occasionally on day off but more on days with work  Works as CNA    The following portions of the patient's history were reviewed and updated as appropriate: allergies, current medications, past family history, past medical history, past social history, past surgical history and problem list     Review of Systems   Constitutional: Negative for activity change, appetite change, chills, fatigue and fever  HENT: Negative for sinus pressure and sore throat  Respiratory: Negative for cough  Cardiovascular: Negative for chest pain  Gastrointestinal: Negative for constipation, diarrhea and nausea  Genitourinary: Negative for frequency and urgency  Neurological: Positive for headaches  Negative for dizziness, light-headedness and numbness  Psychiatric/Behavioral: The patient is nervous/anxious  Objective:  Vitals:    02/09/21 1559   BP: 112/60   BP Location: Left arm   Patient Position: Sitting   Cuff Size: Standard   Pulse: 84   Resp: 18   Temp: 97 5 °F (36 4 °C)   TempSrc: Temporal   SpO2: 98%   Weight: 96 9 kg (213 lb 11 2 oz)   Height: 5' 1" (1 549 m)      Physical Exam  Vitals signs reviewed  Constitutional:       Appearance: Normal appearance  She is obese     HENT:      Right Ear: Tympanic membrane, ear canal and external ear normal  Left Ear: Tympanic membrane, ear canal and external ear normal    Cardiovascular:      Rate and Rhythm: Normal rate and regular rhythm  Pulses: Normal pulses  Heart sounds: Normal heart sounds  Pulmonary:      Effort: Pulmonary effort is normal       Breath sounds: Normal breath sounds  Musculoskeletal: Normal range of motion  Arms:         Hands:    Skin:     General: Skin is warm  Neurological:      Mental Status: She is alert and oriented to person, place, and time  Psychiatric:         Attention and Perception: Attention normal          Mood and Affect: Mood is anxious and depressed  Speech: Speech normal          Behavior: Behavior normal          Thought Content:  Thought content normal          Cognition and Memory: Cognition and memory normal          Judgment: Judgment normal

## 2021-05-20 ENCOUNTER — HOSPITAL ENCOUNTER (EMERGENCY)
Facility: HOSPITAL | Age: 31
Discharge: HOME | End: 2021-05-20
Attending: EMERGENCY MEDICINE
Payer: COMMERCIAL

## 2021-05-20 VITALS
BODY MASS INDEX: 40.4 KG/M2 | TEMPERATURE: 98.6 F | WEIGHT: 214 LBS | HEIGHT: 61 IN | SYSTOLIC BLOOD PRESSURE: 124 MMHG | OXYGEN SATURATION: 97 % | DIASTOLIC BLOOD PRESSURE: 77 MMHG | HEART RATE: 66 BPM | RESPIRATION RATE: 16 BRPM

## 2021-05-20 DIAGNOSIS — R51.9 HEADACHE: Primary | ICD-10-CM

## 2021-05-20 DIAGNOSIS — R03.0 ELEVATED BLOOD PRESSURE READING: ICD-10-CM

## 2021-05-20 LAB
ANION GAP SERPL CALCULATED.3IONS-SCNC: 3 MMOL/L (ref 4–13)
BUN SERPL-MCNC: 13 MG/DL (ref 5–25)
CALCIUM SERPL-MCNC: 8.9 MG/DL (ref 8.3–10.1)
CHLORIDE SERPL-SCNC: 109 MMOL/L (ref 100–108)
CO2 SERPL-SCNC: 29 MMOL/L (ref 21–32)
CREAT SERPL-MCNC: 0.61 MG/DL (ref 0.6–1.3)
GFR SERPL CREATININE-BSD FRML MDRD: 122 ML/MIN/1.73SQ M
GLUCOSE SERPL-MCNC: 80 MG/DL (ref 65–140)
POTASSIUM SERPL-SCNC: 4.1 MMOL/L (ref 3.5–5.3)
SODIUM SERPL-SCNC: 141 MMOL/L (ref 136–145)
TSH SERPL DL<=0.05 MIU/L-ACNC: 0.67 UIU/ML (ref 0.36–3.74)

## 2021-05-20 PROCEDURE — 80048 BASIC METABOLIC PNL TOTAL CA: CPT | Performed by: EMERGENCY MEDICINE

## 2021-05-20 PROCEDURE — 36415 COLL VENOUS BLD VENIPUNCTURE: CPT | Performed by: EMERGENCY MEDICINE

## 2021-05-20 PROCEDURE — 99284 EMERGENCY DEPT VISIT MOD MDM: CPT

## 2021-05-20 PROCEDURE — 96375 TX/PRO/DX INJ NEW DRUG ADDON: CPT

## 2021-05-20 PROCEDURE — 99284 EMERGENCY DEPT VISIT MOD MDM: CPT | Performed by: EMERGENCY MEDICINE

## 2021-05-20 PROCEDURE — 84443 ASSAY THYROID STIM HORMONE: CPT | Performed by: EMERGENCY MEDICINE

## 2021-05-20 PROCEDURE — 96374 THER/PROPH/DIAG INJ IV PUSH: CPT

## 2021-05-20 RX ORDER — HYDROCHLOROTHIAZIDE 12.5 MG/1
12.5 TABLET ORAL DAILY
Qty: 15 TABLET | Refills: 0 | Status: SHIPPED | OUTPATIENT
Start: 2021-05-20 | End: 2022-04-20 | Stop reason: ALTCHOICE

## 2021-05-20 RX ORDER — DIPHENHYDRAMINE HYDROCHLORIDE 50 MG/ML
12.5 INJECTION INTRAMUSCULAR; INTRAVENOUS ONCE
Status: COMPLETED | OUTPATIENT
Start: 2021-05-20 | End: 2021-05-20

## 2021-05-20 RX ORDER — KETOROLAC TROMETHAMINE 30 MG/ML
15 INJECTION, SOLUTION INTRAMUSCULAR; INTRAVENOUS ONCE
Status: COMPLETED | OUTPATIENT
Start: 2021-05-20 | End: 2021-05-20

## 2021-05-20 RX ORDER — METOCLOPRAMIDE HYDROCHLORIDE 5 MG/ML
10 INJECTION INTRAMUSCULAR; INTRAVENOUS ONCE
Status: COMPLETED | OUTPATIENT
Start: 2021-05-20 | End: 2021-05-20

## 2021-05-20 RX ADMIN — DIPHENHYDRAMINE HYDROCHLORIDE 12.5 MG: 50 INJECTION, SOLUTION INTRAMUSCULAR; INTRAVENOUS at 12:59

## 2021-05-20 RX ADMIN — METOCLOPRAMIDE 10 MG: 5 INJECTION, SOLUTION INTRAMUSCULAR; INTRAVENOUS at 12:59

## 2021-05-20 RX ADMIN — KETOROLAC TROMETHAMINE 15 MG: 30 INJECTION, SOLUTION INTRAMUSCULAR; INTRAVENOUS at 12:59

## 2021-05-20 NOTE — DISCHARGE INSTRUCTIONS
Please hydrate well with water  Try to avoid salty foods  Follow up with your primary care provider for re-evaluation of your headaches and your elevated blood pressures  Take hydrochlorothiazide (this is a blood pressure medication) for elevated blood pressure

## 2021-05-20 NOTE — ED NOTES
Patient reports that she is feeling better after the medications and some rest     Lizz Hitchcock RN  05/20/21 1548

## 2021-05-20 NOTE — Clinical Note
Bahman Hartley was seen and treated in our emergency department on 5/20/2021  Diagnosis:     Felice Mckeon  may return to work on return date  She may return on this date: 05/22/2021    Please excuse absence on 5/20-5/21     If you have any questions or concerns, please don't hesitate to call        Evelyn Castellanos MD    ______________________________           _______________          _______________  Hospital Representative                              Date                                Time

## 2021-05-20 NOTE — ED PROVIDER NOTES
EMERGENCY DEPARTMENT ENCOUNTER NOTE    This note has been generated using a voice recognition software  There may be typographic, grammatic, or word substitution errors that have escaped editorial review  ? CHIEF COMPLAINT  Chief Complaint   Patient presents with    Headache     Patient concerned about her blood pressure; reports that she had a headache as well       HPI  Dipesh Friedman is a 27 y o  female with PMH of migraine headaches, depression, anxiety, presenting with headaches and elevated blood pressures  Patient has had a gradual onset headache since Monday, pressure-like, associated with some nausea, but no focal neurologic deficits  Headaches start with pressure behind the eye  Nothing has really helped with the headache  Yesterday and again today, patient felt light-headed at work and when her colleagues checked her blood pressure, and both systolic and diastolic BP was quite elevated in 160s and 90-100s range respectively  Patient has no history of hypertension  She is not on any antihypertensives  She has noticed leg swelling recently  FHx of hypertension in patient's mom  REVIEW OF SYSTEMS    Constitutional: denies fevers, chills  Visual/Eyes: no loss of vision  HENT: no rhinorrhea, no sore throat  Cardiac: no chest pain  Respiratory: no shortness of breath, no cough  GI: no abdominal pain, nausea, vomiting, or diarrhea  Heme/Onc: no easy bruising; patient does note that her last period lasted almost one month  Endocrine: no diabetes  Neuro: no focal weakness or numbness, pressure like headache as above    Ten systems reviewed and negative unless otherwise noted in HPI and above    PAST MEDICAL HISTORY  Past Medical History:   Diagnosis Date    Allergic     Anxiety     Depression     Migraine     UTI (urinary tract infection)        SURGICAL HISTORY  History reviewed  No pertinent surgical history      FAMILY HISTORY  Family History   Problem Relation Age of Onset    Hypertension Mother     Hypertension Father     Coronary artery disease Father     Hyperlipidemia Father     Diabetes Father     Breast cancer Cousin     Colon cancer Cousin     Ovarian cancer Cousin         CURRENT MEDICATIONS  No current facility-administered medications on file prior to encounter  Current Outpatient Medications on File Prior to Encounter   Medication Sig    D3-50 1 25 MG (92245 UT) capsule Take 50,000 Units by mouth once a week    medroxyPROGESTERone (PROVERA) 10 mg tablet 10 mg    sertraline (ZOLOFT) 100 mg tablet Take 1 tablet (100 mg total) by mouth daily    SUMAtriptan (Imitrex) 25 mg tablet Take 1 tablet (25 mg total) by mouth once as needed for migraine (with fluids as early as possible after the onset of a migraine attack; May repeat after 2 hours if headache returns   Do not exceed 200mg in 24 hours) for up to 1 dose       ALLERGIES  No Known Allergies    SOCIAL HISTORY  Social History     Socioeconomic History    Marital status: Single     Spouse name: None    Number of children: None    Years of education: None    Highest education level: None   Occupational History    None   Social Needs    Financial resource strain: None    Food insecurity     Worry: None     Inability: None    Transportation needs     Medical: None     Non-medical: None   Tobacco Use    Smoking status: Never Smoker    Smokeless tobacco: Never Used   Substance and Sexual Activity    Alcohol use: No    Drug use: No    Sexual activity: None   Lifestyle    Physical activity     Days per week: None     Minutes per session: None    Stress: None   Relationships    Social connections     Talks on phone: None     Gets together: None     Attends Yazidism service: None     Active member of club or organization: None     Attends meetings of clubs or organizations: None     Relationship status: None    Intimate partner violence     Fear of current or ex partner: None     Emotionally abused: None     Physically abused: None     Forced sexual activity: None   Other Topics Concern    None   Social History Narrative    None       PHYSICAL EXAM    /85 (BP Location: Left arm)   Pulse 83   Temp 98 6 °F (37 °C) (Oral)   Resp 16   Ht 5' 1" (1 549 m)   Wt 97 1 kg (214 lb)   SpO2 98%   BMI 40 43 kg/m²   Vital signs and nursing notes reviewed    CONSTITUTIONAL: female appearing stated age resting in bed, in no acute distress  HEENT: atraumatic, normocephalic  Sclera anicteric, conjunctiva are not injected  Moist oral mucosa  CARDIOVASCULAR/CHEST: RRR, no M/R/G  2+ radial pulses  PULMONARY: Breathing comfortably on RA  Breath sounds are equal and clear to auscultation  ABDOMEN: non-distended  BS present, normoactive  Non-tender  MSK: moves all extremities, no deformities, trace peripheral edema up to mid leg bilaterally, no calf asymmetry  NEURO: Awake, alert, and oriented x 3  Pupils 2 mm and reactive, extraocular movements are intact without nystagmus, face symmetric, uvula elevates midline, tongue protrudes midline  5/5 strength in bilateral upper and lower extremities  No ataxia  Moves all extremities spontaneously  No focal neurologic deficits  SKIN: Warm, appears well-perfused  MENTAL STATUS: Normal affect      ?   LABS AND TESTS    Results Reviewed     Procedure Component Value Units Date/Time    Basic metabolic panel [[id number]]  (Abnormal) Collected: 05/20/21 1242    Lab Status: Final result Specimen: Blood from Arm, Right Updated: 05/20/21 1320     Sodium 141 mmol/L      Potassium 4 1 mmol/L      Chloride 109 mmol/L      CO2 29 mmol/L      ANION GAP 3 mmol/L      BUN 13 mg/dL      Creatinine 0 61 mg/dL      Glucose 80 mg/dL      Calcium 8 9 mg/dL      eGFR 122 ml/min/1 73sq m     Narrative:      [place] guidelines for Chronic Kidney Disease (CKD):     Stage 1 with normal or high GFR (GFR > 90 mL/min/1 73 square meters)    Stage 2 Mild CKD (GFR = 60-89 mL/min/1 73 square meters)    Stage 3A Moderate CKD (GFR = 45-59 mL/min/1 73 square meters)    Stage 3B Moderate CKD (GFR = 30-44 mL/min/1 73 square meters)    Stage 4 Severe CKD (GFR = 15-29 mL/min/1 73 square meters)    Stage 5 End Stage CKD (GFR <15 mL/min/1 73 square meters)  Note: GFR calculation is accurate only with a steady state creatinine    TSH [[id number]]  (Normal) Collected: 05/20/21 1242    Lab Status: Final result Specimen: Blood from Arm, Right Updated: 05/20/21 1320     TSH 3RD GENERATON 0 670 uIU/mL     Narrative:      Patients undergoing fluorescein dye angiography may retain small amounts of fluorescein in the body for 48-72 hours post procedure  Samples containing fluorescein can produce falsely depressed TSH values  If the patient had this procedure,a specimen should be resubmitted post fluorescein clearance  No orders to display       ED COURSE & MEDICAL DECISION MAKING  Procedures             Medications   metoclopramide (REGLAN) injection 10 mg (10 mg Intravenous Given 5/20/21 1259)   diphenhydrAMINE (BENADRYL) injection 12 5 mg (12 5 mg Intravenous Given 5/20/21 1259)   ketorolac (TORADOL) injection 15 mg (15 mg Intravenous Given 5/20/21 159)     27year old female presenting with headaches, episodes of light-headedness, and episodes of elevated BP  VS reviewed, hypertensive, WNL otherwise  DDx includes uncontrolled headaches with elevated BP secondary to pain, IIH, SAH, mass, malignant hypertension, etc   Neurologic exam is reassuring  There are no specific features such as positional headaches, loss of peripheral vision to point to IIH, I do not believe patient necessitates a diagnostic lumbar puncture emergently at present, lest her headaches persist or new symptoms/features do develop  Will obtain basic labs and will treat headache symptomatically  BMP unremarkable, intact renal function   TSH WNL (obtained given prolonged irregular menses, differential includes PCOS, DUB, recommend follow up with ObGyn)  Following migraine cocktail, headache improved from 9/10 to 4/10  BP spontaneously improved as well  Recommend rest, good hydration, avoiding salt intake  Recommend life-style modifications and weight loss, as little as 5% of weight loss would lower BP by about 10 mmHg  I did reach out to patient's PCP, Anderson Ortiz  We discussed patient's case  Will trial patient on HCTZ 12 5 qd (I would suggest checking BP daily to see whether this is truly needed), will refer to Neurology given history of migraines and current difficulties with headaches  Of note, I avoided administering decadron today as a part of migraine cocktail as it may result in fluid retention and BP elevation  Patient discharged to home with recommendations for symptom control, return precautions, and plan for follow up  MDM  Number of Diagnoses or Management Options  Elevated blood pressure reading: new and requires workup  Headache: new and requires workup     Amount and/or Complexity of Data Reviewed  Clinical lab tests: ordered and reviewed  Review and summarize past medical records: yes  Discuss the patient with other providers: yes (PCP)    Risk of Complications, Morbidity, and/or Mortality  Presenting problems: moderate  Diagnostic procedures: moderate  Management options: moderate    Patient Progress  Patient progress: improved      CLINICAL IMPRESSION  Final diagnoses:   Headache   Elevated blood pressure reading       DISPOSITION  Time reflects when diagnosis was documented in both MDM as applicable and the Disposition within this note     Time User Action Codes Description Comment    5/20/2021  3:10 PM Charma Mark Add [R51 9] Headache     5/20/2021  3:10 PM Charma Mark Add [R03 0] Elevated blood pressure reading       ED Disposition     ED Disposition Condition Date/Time Comment    Discharge Stable Thu May 20, 2021  3:10 PM Tristan Venegas discharge to home/self care              Follow-up Information     Follow up With Specialties Details Why Contact Info Additional AUSTIN Alcala Nurse Practitioner Call in 1 day Emergency Room Follow-up [address] [facility] Emergency Department Emergency Medicine Go to  As needed, If symptoms worsen [address]  [address] [facility] [address] Emergency Department, [address], [facility] [place], [facility] 108    [facility]   [address] [facility]  [address] [facility]  49  [phone]  [phone]             [address]  Discharge Medication List as of 5/20/2021  3:15 PM      START taking these medications    Details   hydrochlorothiazide (HYDRODIURIL) 12 5 mg tablet Take 1 tablet (12 5 mg total) by mouth daily for 15 days, Starting Thu 5/20/2021, Until Fri 6/4/2021, Normal         CONTINUE these medications which have NOT CHANGED    Details   D3-50 1 25 MG (14154 UT) capsule Take 50,000 Units by mouth once a week, Starting Tue 1/7/2020, Historical Med      medroxyPROGESTERone (PROVERA) 10 mg tablet 10 mg, Starting Fri 11/9/2018, Historical Med      sertraline (ZOLOFT) 100 mg tablet Take 1 tablet (100 mg total) by mouth daily, Starting Tue 2/9/2021, Normal      SUMAtriptan (Imitrex) 25 mg tablet Take 1 tablet (25 mg total) by mouth once as needed for migraine (with fluids as early as possible after the onset of a migraine attack; May repeat after 2 hours if headache returns   Do not exceed 200mg in 24 hours) for up to 1 dose, Starting Tue 10/27/2 020, Normal              Thom Davidson MD  05/24/21 0038

## 2021-06-21 ENCOUNTER — TELEPHONE (OUTPATIENT)
Dept: NEUROLOGY | Facility: CLINIC | Age: 31
End: 2021-06-21

## 2021-06-23 ENCOUNTER — TELEPHONE (OUTPATIENT)
Dept: NEUROLOGY | Facility: CLINIC | Age: 31
End: 2021-06-23

## 2021-06-23 NOTE — TELEPHONE ENCOUNTER
Best contact number for patient:[phone]    Emergency Contact name and number:None     Referring provider and telephone number:[facility]     Primary Care Provider Name and if affiliated with [facility]: Delfino Spain     Reason for Appointment/Dx:Migraines     Have you seen and followed up with a pediatric Neurologist for this disease in the past?  No        Neurology Location patient would like to be seen:[place]    Order received? Yes                                                Records Received? No    Have you ever seen another Neurologist?       No    Insurance [address] [facility]     ID/Policy #: [id number]    Secondary Insurance:    ID/Policy#: Workman's Comp/ Accident/ School  Information      Workman's Comp/Accident/School related?        None    If yes name of Insurance company:    Claim #:    Date of Injury:    Type of Injury:     Name and Telephone Number:    Notes:Appointment schedule with patient insurance confirmed                    Appointment date: 11- 3:00pm with [facility]

## 2021-08-09 ENCOUNTER — HOSPITAL ENCOUNTER (EMERGENCY)
Facility: HOSPITAL | Age: 31
Discharge: HOME | End: 2021-08-09
Attending: EMERGENCY MEDICINE | Admitting: EMERGENCY MEDICINE
Payer: COMMERCIAL

## 2021-08-09 VITALS
HEART RATE: 74 BPM | SYSTOLIC BLOOD PRESSURE: 157 MMHG | DIASTOLIC BLOOD PRESSURE: 107 MMHG | OXYGEN SATURATION: 98 % | TEMPERATURE: 98 F | RESPIRATION RATE: 18 BRPM

## 2021-08-09 DIAGNOSIS — Z20.822 ENCOUNTER FOR LABORATORY TESTING FOR COVID-19 VIRUS: ICD-10-CM

## 2021-08-09 DIAGNOSIS — R05.9 COUGH: Primary | ICD-10-CM

## 2021-08-09 LAB — SARS-COV-2 RNA RESP QL NAA+PROBE: NEGATIVE

## 2021-08-09 PROCEDURE — 99282 EMERGENCY DEPT VISIT SF MDM: CPT | Performed by: PHYSICIAN ASSISTANT

## 2021-08-09 PROCEDURE — U0005 INFEC AGEN DETEC AMPLI PROBE: HCPCS | Performed by: PHYSICIAN ASSISTANT

## 2021-08-09 PROCEDURE — U0003 INFECTIOUS AGENT DETECTION BY NUCLEIC ACID (DNA OR RNA); SEVERE ACUTE RESPIRATORY SYNDROME CORONAVIRUS 2 (SARS-COV-2) (CORONAVIRUS DISEASE [COVID-19]), AMPLIFIED PROBE TECHNIQUE, MAKING USE OF HIGH THROUGHPUT TECHNOLOGIES AS DESCRIBED BY CMS-2020-01-R: HCPCS | Performed by: PHYSICIAN ASSISTANT

## 2021-08-09 PROCEDURE — 99283 EMERGENCY DEPT VISIT LOW MDM: CPT

## 2021-08-09 NOTE — ED PROVIDER NOTES
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 Taking? D3-50 1 25 MG (25303 UT) capsule  Self Yes No   Sig: Take 50,000 Units by mouth once a week   SUMAtriptan (Imitrex) 25 mg tablet   No No   Sig: Take 1 tablet (25 mg total) by mouth once as needed for migraine (with fluids as early as possible after the onset of a migraine attack; May repeat after 2 hours if headache returns  Do not exceed 200mg in 24 hours) for up to 1 dose   hydrochlorothiazide (HYDRODIURIL) 12 5 mg tablet   No No   Sig: Take 1 tablet (12 5 mg total) by mouth daily for 15 days   medroxyPROGESTERone (PROVERA) 10 mg tablet  Self Yes No   Sig: 10 mg   sertraline (ZOLOFT) 100 mg tablet   No No   Sig: Take 1 tablet (100 mg total) by mouth daily      Facility-Administered Medications: None       Past Medical History:   Diagnosis Date    Allergic     Anxiety     Depression     Migraine     UTI (urinary tract infection)        History reviewed  No pertinent surgical history      Family History   Problem Relation Age of Onset    Hypertension Mother     Hypertension Father     Coronary artery disease Father     Hyperlipidemia Father     Diabetes Father     Breast cancer Cousin     Colon cancer Cousin     Ovarian cancer Cousin      I have reviewed and agree with the history as documented  E-Cigarette/Vaping    E-Cigarette Use Never User      E-Cigarette/Vaping Substances    Nicotine No     THC No     CBD No     Flavoring No     Other No     Unknown No      Social History     Tobacco Use    Smoking status: Never Smoker    Smokeless tobacco: Never Used   Vaping Use    Vaping Use: Never used   Substance Use Topics    Alcohol use: No    Drug use: No       Review of Systems   Constitutional: Positive for chills  Negative for fever  HENT: Positive for rhinorrhea and sore throat  Eyes: Negative for discharge  Respiratory: Positive for cough  Negative for shortness of breath  Cardiovascular: Negative for chest pain  Gastrointestinal: Positive for diarrhea  Negative for abdominal pain, nausea and vomiting  Genitourinary: Negative for difficulty urinating  Musculoskeletal: Negative for myalgias  Skin: Negative for rash  Neurological: Negative for headaches  Physical Exam  Physical Exam  Vitals and nursing note reviewed  Constitutional:       Appearance: Normal appearance  HENT:      Head: Normocephalic and atraumatic  Right Ear: External ear normal       Left Ear: External ear normal    Eyes:      General: No scleral icterus  Cardiovascular:      Rate and Rhythm: Normal rate and regular rhythm  Heart sounds: Normal heart sounds  Pulmonary:      Effort: Pulmonary effort is normal  No respiratory distress  Breath sounds: Normal breath sounds  No wheezing or rhonchi  Chest:      Chest wall: No tenderness  Abdominal:      General: There is no distension  Musculoskeletal:      Cervical back: Neck supple  Lymphadenopathy:      Cervical: No cervical adenopathy     Skin:     General: Skin is warm and dry  Neurological:      Mental Status: She is alert and oriented to person, place, and time     Psychiatric:         Mood and Affect: Mood normal          Vital Signs  ED Triage Vitals   Temperature Pulse Respirations Blood Pressure SpO2   08/09/21 1103 08/09/21 1103 08/09/21 1103 08/09/21 1105 08/09/21 1103   98 °F (36 7 °C) 74 18 (!) 157/107 98 %      Temp Source Heart Rate Source Patient Position - Orthostatic VS BP Location FiO2 (%)   08/09/21 1103 08/09/21 1103 08/09/21 1103 08/09/21 1103 --   Oral Monitor Lying Right arm       Pain Score       --                  Vitals:    08/09/21 1103 08/09/21 1105   BP:  (!) 157/107   Pulse: 74    Patient Position - Orthostatic VS: Lying          Visual Acuity      ED Medications  Medications - No data to display    Diagnostic Studies  Results Reviewed     Procedure Component Value Units Date/Time    Novel Coronavirus (Covid-19),PCR [facility] - 24 Hour Routine [[id number]] Collected: 08/09/21 1124    Lab Status: No result Specimen: Nares from Nasopharyngeal Swab                  No orders to display              Procedures  Procedures         ED Course                                           MDM  Number of Diagnoses or Management Options     Amount and/or Complexity of Data Reviewed  Clinical lab tests: ordered    Risk of Complications, Morbidity, and/or Mortality  General comments: Differential diagnosis includes but is not limited to: uri, allergic rhinitis, post nasal drip, covid     Patient Progress  Patient progress: stable      Disposition  Final diagnoses:   Cough   Encounter for laboratory testing for COVID-19 virus     Time reflects when diagnosis was documented in both MDM as applicable and the Disposition within this note     Time User Action Codes Description Comment    8/9/2021 11:22 AM Behzad RICHARDSON Add [R05] Cough     8/9/2021 11:22 AM Tj Mancera [Z20 822] Encounter for laboratory testing for COVID-19 virus       ED Disposition     ED Disposition Condition Date/Time Comment    Discharge Stable Mon Aug 9, 2021 11:22 AM Yves Pisano discharge to home/self care  Follow-up Information     Follow up With Specialties Details Why Contact Info Additional AUSTIN Alcala Nurse Practitioner In 3 days  [address] [facility] [phone]       [facility] Emergency Department Emergency Medicine  If symptoms worsen [address]  [address] [place] [address] Emergency Department, [address] [place] [address], [address]   [phone]          Patient's Medications   Discharge Prescriptions    No medications on file     No discharge procedures on file      PDMP Review     None          ED Provider  Electronically Signed by           Rebecca Her PA-C  08/09/21 1755 59[facility] Carol Mobley PA-C  08/09/21 1129

## 2021-08-09 NOTE — Clinical Note
Jacinda Valenzuela was seen and treated in our emergency department on 8/9/2021  Diagnosis:     Jagjit Oliva    She may return on this date: 08/12/2021         If you have any questions or concerns, please don't hesitate to call        Rebecca Her PA-C    ______________________________           _______________          _______________  Hospital Representative                              Date                                Time

## 2021-08-09 NOTE — DISCHARGE INSTRUCTIONS
Postnasal Drip   WHAT YOU NEED TO KNOW:   Postnasal drip is a condition that causes a large amount of mucus to collect in your throat or nose  It may also be called upper airway cough syndrome because the mucus causes repeated coughing  You may have a sore throat, or throat tissues may swell  This may feel like a lump in your throat  You may also feel like you need to clear your throat often  DISCHARGE INSTRUCTIONS:   Contact your healthcare provider if:   · You have trouble breathing because of the mucus  · You have new or worsening symptoms, even with treatment  · You have signs of an infection, such as yellow or green mucus, or a fever  · You have questions or concerns about your condition or care  Medicines:   · Medicines  may be given to thin the mucus  You may need to swallow the medicine or use a device to flush your sinuses with liquid squirted into your nose  Nasal sprays may also be needed to keep the tissues in your nose moist  Medicines can also relieve congestion  Allergy medicine may help if your symptoms are caused by seasonal allergies, such as hay fever  You may need medicine to help control GERD  · Antibiotics  may be needed to treat a bacterial infection  · Take your medicine as directed  Contact your healthcare provider if you think your medicine is not helping or if you have side effects  Tell him or her if you are allergic to any medicine  Keep a list of the medicines, vitamins, and herbs you take  Include the amounts, and when and why you take them  Bring the list or the pill bottles to follow-up visits  Carry your medicine list with you in case of an emergency  Manage postnasal drip:   · Use a humidifier or vaporizer  Use a cool mist humidifier or a vaporizer to increase air moisture in your home  This may make it easier for you to breathe  · Drink more liquids as directed  Liquids help keep your air passages moist and help you cough up mucus   Ask how much liquid to drink each day and which liquids are best for you  · Avoid cold air and dry, heated air  Cold or dry air can trigger postnasal drip  Try to stay inside on cold days, or keep your mouth covered  Do not stay long in areas that have dry, heated air  · Do not smoke, and avoid secondhand smoke  Nicotine and other chemicals in cigarettes and cigars can irritate your throat and make coughing worse  Ask your healthcare provider for information if you currently smoke and need help to quit  E-cigarettes or smokeless tobacco still contain nicotine  Talk to your healthcare provider before you use these products  Follow up with your healthcare provider as directed:  Write down your questions so you remember to ask them during your visits  © Copyright [employer] 2021 Information is for End User's use only and may not be sold, redistributed or otherwise used for commercial purposes  All illustrations and images included in CareNotes® are the copyrighted property of A D A M , Inc  or [facility]   The above information is an  only  It is not intended as medical advice for individual conditions or treatments  Talk to your doctor, nurse or pharmacist before following any medical regimen to see if it is safe and effective for you

## 2021-11-08 ENCOUNTER — TELEPHONE (OUTPATIENT)
Dept: NEUROLOGY | Facility: CLINIC | Age: 31
End: 2021-11-08

## 2021-11-10 ENCOUNTER — TELEPHONE (OUTPATIENT)
Dept: NEUROLOGY | Facility: CLINIC | Age: 31
End: 2021-11-10

## 2022-04-20 ENCOUNTER — OFFICE VISIT (OUTPATIENT)
Dept: FAMILY MEDICINE CLINIC | Facility: CLINIC | Age: 32
End: 2022-04-20
Payer: COMMERCIAL

## 2022-04-20 VITALS
TEMPERATURE: 98.2 F | WEIGHT: 218.6 LBS | HEIGHT: 63 IN | HEART RATE: 95 BPM | BODY MASS INDEX: 38.73 KG/M2 | OXYGEN SATURATION: 99 % | SYSTOLIC BLOOD PRESSURE: 120 MMHG | DIASTOLIC BLOOD PRESSURE: 80 MMHG | RESPIRATION RATE: 18 BRPM

## 2022-04-20 DIAGNOSIS — Z00.00 ANNUAL PHYSICAL EXAM: Primary | ICD-10-CM

## 2022-04-20 DIAGNOSIS — F32.2 CURRENT SEVERE EPISODE OF MAJOR DEPRESSIVE DISORDER WITHOUT PSYCHOTIC FEATURES WITHOUT PRIOR EPISODE (HCC): ICD-10-CM

## 2022-04-20 DIAGNOSIS — R10.32 LEFT LOWER QUADRANT ABDOMINAL PAIN: ICD-10-CM

## 2022-04-20 DIAGNOSIS — J45.20 MILD INTERMITTENT ASTHMA, UNSPECIFIED WHETHER COMPLICATED: ICD-10-CM

## 2022-04-20 DIAGNOSIS — K59.00 CONSTIPATION, UNSPECIFIED CONSTIPATION TYPE: ICD-10-CM

## 2022-04-20 DIAGNOSIS — Z11.59 ENCOUNTER FOR HEPATITIS C SCREENING TEST FOR LOW RISK PATIENT: ICD-10-CM

## 2022-04-20 PROCEDURE — 99395 PREV VISIT EST AGE 18-39: CPT | Performed by: NURSE PRACTITIONER

## 2022-04-20 PROCEDURE — 3008F BODY MASS INDEX DOCD: CPT | Performed by: NURSE PRACTITIONER

## 2022-04-20 PROCEDURE — 1036F TOBACCO NON-USER: CPT | Performed by: NURSE PRACTITIONER

## 2022-04-20 PROCEDURE — 3725F SCREEN DEPRESSION PERFORMED: CPT | Performed by: NURSE PRACTITIONER

## 2022-04-20 NOTE — PROGRESS NOTES
Answers for HPI/ROS submitted by the patient on 2022  Chronicity: new  Onset: in the past 7 days  Frequency: constantly  Progression since onset: gradually worsening  Pain location: lumbar spine, thoracic spine  Pain quality: aching, stabbing  Radiates to: does not radiate  Pain - numeric: 8/10  Pain is: worse during the night  Aggravated by: bending, position, lying down, sitting, twisting  Stiffness is present: in the morning  abdominal pain: Yes  bladder incontinence: No  bowel incontinence: No  chest pain: No  dysuria: No  fever: No  headaches: No  leg pain: No  numbness: No  paresis: No  paresthesias: Yes  pelvic pain: Yes  perianal numbness: No  tingling: No  weakness: No  weight loss: No  Risk factors: poor posture    ADULT ANNUAL [facility]    NAME: Arjun Bailey  AGE: 32 y o  SEX: female  : 1990     DATE: 2022     Assessment and Plan:     Problem List Items Addressed This Visit        Respiratory    Asthma       Other    Current severe episode of major depressive disorder without psychotic features without [facility]      Other Visit Diagnoses     Annual physical exam    -  Primary    Relevant Orders    CBC and differential    Comprehensive metabolic panel    Lipid Panel with Direct LDL reflex    BMI 39 0-39 9,adult        Encounter for hepatitis C screening test for low risk patient        Relevant Orders    Hepatitis C antibody    Left lower quadrant abdominal pain        Relevant Orders    CT abdomen pelvis wo contrast  Had u/s at GYN and nothing abnormal seen     Cont pain on left mid to lower abdomen with occasional pain under left ribs  Hx of constipation     Constipation, unspecified constipation type        Relevant Orders    CT abdomen pelvis wo contrast      Will check with workmans comp about back issues     Immunizations and preventive care screenings were discussed with patient today  Appropriate education was printed on patient's after visit summary  Counseling:  Alcohol/drug use: discussed moderation in alcohol intake, the recommendations for healthy alcohol use, and avoidance of illicit drug use  Dental Health: discussed importance of regular tooth brushing, flossing, and dental visits  Injury prevention: discussed safety/seat belts, safety helmets, smoke detectors, carbon dioxide detectors, and smoking near bedding or upholstery  Sexual health: discussed sexually transmitted diseases, partner selection, use of condoms, avoidance of unintended pregnancy, and contraceptive alternatives  · Exercise: the importance of regular exercise/physical activity was discussed  Recommend exercise 3-5 times per week for at least 30 minutes  BMI Counseling: Body mass index is 39 03 kg/m²  The BMI is above normal  Nutrition recommendations include decreasing portion sizes, encouraging healthy choices of fruits and vegetables, decreasing fast food intake, consuming healthier snacks, limiting drinks that contain sugar and moderation in carbohydrate intake  Exercise recommendations include exercising 3-5 times per week  No pharmacotherapy was ordered  Rationale for BMI follow-up plan is due to patient being overweight or obese  No follow-ups on file  Chief Complaint:     Chief Complaint   Patient presents with    Physical Exam    Back Pain     possible work related  x one week   spine       History of Present Illness:     Adult Annual Physical   Patient here for a comprehensive physical exam  The patient reports problems - lower back pain   Feels she hurt at work    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  Diet and Physical Activity  · Diet/Nutrition: poor diet and consuming 3-5 servings of fruits/vegetables daily  · Exercise: no formal exercise        Depression Screening  PHQ-2/9 Depression Screening    Little interest or pleasure in doing things: 0 - not at all  Feeling down, depressed, or hopeless: 0 - not at all  PHQ-2 Score: 0  PHQ-2 Interpretation: Negative depression screen       General Health  · Sleep: sleeps well  · Hearing: normal - bilateral   · Vision: wears glasses  · Dental: no dental visits for >1 year  /GYN Health  · Last menstrual period: irregular  · Contraceptive method: none  · History of STDs?: no      Review of Systems:     Review of Systems   Constitutional: Negative for activity change, appetite change, fatigue and fever  HENT: Negative for congestion, rhinorrhea and sore throat  Respiratory: Negative for cough  Cardiovascular: Negative for chest pain  Gastrointestinal: Positive for abdominal pain  Negative for constipation, diarrhea and nausea  Havng left sided abdominal pain with radiation to back which comes and goes   Going on about 1 month   Genitourinary: Positive for pelvic pain  Negative for dysuria and frequency  Musculoskeletal: Positive for back pain  Neurological: Negative for weakness, numbness and headaches  Psychiatric/Behavioral: The patient is not nervous/anxious  Past Medical History:     Past Medical History:   Diagnosis Date    Allergic     Anxiety     Depression     Migraine     UTI (urinary tract infection)       Past Surgical History:     History reviewed  No pertinent surgical history     Social History:     Social History     Socioeconomic History    Marital status: Single     Spouse name: None    Number of children: None    Years of education: None    Highest education level: None   Occupational History    None   Tobacco Use    Smoking status: Never Smoker    Smokeless tobacco: Never Used   Vaping Use    Vaping Use: Never used   Substance and Sexual Activity    Alcohol use: No    Drug use: No    Sexual activity: None   Other Topics Concern    None   Social History Narrative    None     Social Determinants of Health     Financial Resource Strain: Not on file   Food Insecurity: Not on file Transportation Needs: Not on file   Physical Activity: Not on file   Stress: Not on file   Social Connections: Not on file   Intimate Partner Violence: Not on file   Housing Stability: Not on file      Family History:     Family History   Problem Relation Age of Onset    Hypertension Mother     Hypertension Father     Coronary artery disease Father     Hyperlipidemia Father     Diabetes Father     Breast cancer Cousin     Colon cancer Cousin     Ovarian cancer Cousin       Current Medications:     Current Outpatient Medications   Medication Sig Dispense Refill    Multiple Vitamins-Minerals (MULTIVITAMIN ADULTS PO) Take 1 tablet by mouth daily       No current facility-administered medications for this visit  Allergies:     No Known Allergies   Physical Exam:     /80 (BP Location: Left arm, Patient Position: Sitting, Cuff Size: Large)   Pulse 95   Temp 98 2 °F (36 8 °C) (Temporal)   Resp 18   Ht 5' 2 75" (1 594 m)   Wt 99 2 kg (218 lb 9 6 oz)   SpO2 99%   BMI 39 03 kg/m²     Physical Exam  Vitals and nursing note reviewed  Constitutional:       General: She is not in acute distress  Appearance: She is well-developed  HENT:      Head: Normocephalic and atraumatic  Eyes:      Conjunctiva/sclera: Conjunctivae normal    Cardiovascular:      Rate and Rhythm: Normal rate and regular rhythm  Heart sounds: No murmur heard  Pulmonary:      Effort: Pulmonary effort is normal  No respiratory distress  Breath sounds: Normal breath sounds  Abdominal:      General: Bowel sounds are normal       Palpations: Abdomen is soft  Tenderness: There is abdominal tenderness  Comments: Hx of constipation    Musculoskeletal:      Cervical back: Neck supple  Thoracic back: Spasms and tenderness present  Decreased range of motion  Lumbar back: Spasms and tenderness present  Back:    Skin:     General: Skin is warm and dry     Neurological:      Mental Status: She is alert            Avis Sam, [address] [place] [address]

## 2022-04-20 NOTE — PATIENT INSTRUCTIONS

## 2022-04-22 ENCOUNTER — APPOINTMENT (OUTPATIENT)
Dept: URGENT CARE | Age: 32
End: 2022-04-22
Payer: OTHER MISCELLANEOUS

## 2022-04-22 PROCEDURE — 99283 EMERGENCY DEPT VISIT LOW MDM: CPT

## 2022-04-22 PROCEDURE — G0382 LEV 3 HOSP TYPE B ED VISIT: HCPCS

## 2022-04-29 ENCOUNTER — HOSPITAL ENCOUNTER (OUTPATIENT)
Dept: RADIOLOGY | Facility: IMAGING CENTER | Age: 32
Discharge: HOME | End: 2022-04-29
Payer: COMMERCIAL

## 2022-04-29 ENCOUNTER — APPOINTMENT (OUTPATIENT)
Dept: URGENT CARE | Age: 32
End: 2022-04-29
Payer: OTHER MISCELLANEOUS

## 2022-04-29 DIAGNOSIS — K59.00 CONSTIPATION, UNSPECIFIED CONSTIPATION TYPE: ICD-10-CM

## 2022-04-29 DIAGNOSIS — R10.32 LEFT LOWER QUADRANT ABDOMINAL PAIN: ICD-10-CM

## 2022-04-29 PROCEDURE — 74176 CT ABD & PELVIS W/O CONTRAST: CPT

## 2022-04-29 PROCEDURE — 99213 OFFICE O/P EST LOW 20 MIN: CPT | Performed by: PREVENTIVE MEDICINE

## 2022-05-04 NOTE — RESULT ENCOUNTER NOTE
CT mild diverticulosis    NO infection seen  Als some fatty liver  Healther eating    Decrease fatty greasy and fast foods

## 2022-05-18 ENCOUNTER — APPOINTMENT (OUTPATIENT)
Dept: URGENT CARE | Age: 32
End: 2022-05-18
Payer: OTHER MISCELLANEOUS

## 2022-05-18 PROCEDURE — 99213 OFFICE O/P EST LOW 20 MIN: CPT | Performed by: PREVENTIVE MEDICINE

## 2022-06-01 ENCOUNTER — APPOINTMENT (OUTPATIENT)
Dept: URGENT CARE | Age: 32
End: 2022-06-01
Payer: OTHER MISCELLANEOUS

## 2022-06-01 PROCEDURE — 99213 OFFICE O/P EST LOW 20 MIN: CPT | Performed by: PREVENTIVE MEDICINE

## 2022-06-17 ENCOUNTER — TELEPHONE (OUTPATIENT)
Dept: FAMILY MEDICINE CLINIC | Facility: CLINIC | Age: 32
End: 2022-06-17

## 2022-06-17 DIAGNOSIS — R51.9 NONINTRACTABLE HEADACHE, UNSPECIFIED CHRONICITY PATTERN, UNSPECIFIED HEADACHE TYPE: Primary | ICD-10-CM

## 2022-06-17 NOTE — TELEPHONE ENCOUNTER
Patient called to say she is scheduled with [facility] 06/24/22,  was told needs a new ambulatory referral since the other one is invalid since the appt was rescheduled several times

## 2022-07-08 ENCOUNTER — HOSPITAL ENCOUNTER (EMERGENCY)
Facility: HOSPITAL | Age: 32
Discharge: HOME | End: 2022-07-08
Attending: EMERGENCY MEDICINE
Payer: COMMERCIAL

## 2022-07-08 VITALS
SYSTOLIC BLOOD PRESSURE: 170 MMHG | RESPIRATION RATE: 18 BRPM | HEART RATE: 91 BPM | OXYGEN SATURATION: 100 % | DIASTOLIC BLOOD PRESSURE: 109 MMHG | TEMPERATURE: 99.3 F

## 2022-07-08 DIAGNOSIS — K06.8 PAIN IN GUMS: Primary | ICD-10-CM

## 2022-07-08 DIAGNOSIS — J06.9 URI (UPPER RESPIRATORY INFECTION): ICD-10-CM

## 2022-07-08 DIAGNOSIS — J45.909 ASTHMA: ICD-10-CM

## 2022-07-08 LAB
FLUAV RNA RESP QL NAA+PROBE: NEGATIVE
FLUBV RNA RESP QL NAA+PROBE: NEGATIVE
RSV RNA RESP QL NAA+PROBE: NEGATIVE
SARS-COV-2 RNA RESP QL NAA+PROBE: POSITIVE

## 2022-07-08 PROCEDURE — 96372 THER/PROPH/DIAG INJ SC/IM: CPT

## 2022-07-08 PROCEDURE — 99284 EMERGENCY DEPT VISIT MOD MDM: CPT | Performed by: EMERGENCY MEDICINE

## 2022-07-08 PROCEDURE — 99283 EMERGENCY DEPT VISIT LOW MDM: CPT

## 2022-07-08 PROCEDURE — 0241U HB NFCT DS VIR RESP RNA 4 TRGT: CPT

## 2022-07-08 PROCEDURE — 94640 AIRWAY INHALATION TREATMENT: CPT

## 2022-07-08 RX ORDER — CHLORHEXIDINE GLUCONATE 0.12 MG/ML
15 RINSE ORAL 2 TIMES DAILY
Qty: 300 ML | Refills: 0 | Status: SHIPPED | OUTPATIENT
Start: 2022-07-08 | End: 2022-07-18

## 2022-07-08 RX ORDER — IPRATROPIUM BROMIDE AND ALBUTEROL SULFATE 2.5; .5 MG/3ML; MG/3ML
3 SOLUTION RESPIRATORY (INHALATION)
Status: DISCONTINUED | OUTPATIENT
Start: 2022-07-08 | End: 2022-07-08 | Stop reason: HOSPADM

## 2022-07-08 RX ORDER — PREDNISONE 20 MG/1
40 TABLET ORAL ONCE
Status: COMPLETED | OUTPATIENT
Start: 2022-07-08 | End: 2022-07-08

## 2022-07-08 RX ORDER — KETOROLAC TROMETHAMINE 30 MG/ML
15 INJECTION, SOLUTION INTRAMUSCULAR; INTRAVENOUS ONCE
Status: DISCONTINUED | OUTPATIENT
Start: 2022-07-08 | End: 2022-07-08

## 2022-07-08 RX ORDER — KETOROLAC TROMETHAMINE 30 MG/ML
15 INJECTION, SOLUTION INTRAMUSCULAR; INTRAVENOUS ONCE
Status: COMPLETED | OUTPATIENT
Start: 2022-07-08 | End: 2022-07-08

## 2022-07-08 RX ADMIN — PREDNISONE 40 MG: 20 TABLET ORAL at 13:40

## 2022-07-08 RX ADMIN — IPRATROPIUM BROMIDE AND ALBUTEROL SULFATE 3 ML: .5; 3 SOLUTION RESPIRATORY (INHALATION) at 13:42

## 2022-07-08 RX ADMIN — KETOROLAC TROMETHAMINE 15 MG: 30 INJECTION, SOLUTION INTRAMUSCULAR; INTRAVENOUS at 13:40

## 2022-07-08 NOTE — ED PROVIDER NOTES
History  Chief Complaint   Patient presents with    Sore Throat     For two days, has asthma and used her inhaler but still has chest tightness    Dental Problem     Top front tooth hurts and "has a bubble" around it     Patient is a 34yoF with pmh significant for asthma  Patient has never required prior hospitalizations for her asthma is a never required intubation in the past for her asthma  Patient states that she had COVID in the past and since has had this nonproductive cough  Patient states of as of 4 days ago she started with sore throat, and the sensation of her lungs closing off  Patient denies any fevers, chills, chest pain, nausea, vomiting, diarrhea, constipation, headache, dizziness, numbness or tingling in any of her extremities  Patient states that she is having a sore throat but no ear pain or sinus congestion  Patient has tried Robitussin for her cough as well as sore throat but has not tried anything else  Patient also notes that she woke up this morning and noticed that her gum was swelling above her left incisor  Patient states she tried a new toothbrush last night but has not had any trauma to the area  Patient has been trying her inhaler for symptomatic relief but has not been able to  Patient also endorses seasonal allergies which have been worse over the past couple days  Prior to Admission Medications   Prescriptions Last Dose Informant Patient Reported? Taking? Multiple Vitamins-Minerals (MULTIVITAMIN ADULTS PO)   Yes No   Sig: Take 1 tablet by mouth daily      Facility-Administered Medications: None       Past Medical History:   Diagnosis Date    Allergic     Anxiety     Depression     Migraine     UTI (urinary tract infection)        History reviewed  No pertinent surgical history      Family History   Problem Relation Age of Onset    Hypertension Mother     Hypertension Father     Coronary artery disease Father     Hyperlipidemia Father     Diabetes Father     Breast cancer Cousin     Colon cancer Cousin     Ovarian cancer Cousin      I have reviewed and agree with the history as documented  E-Cigarette/Vaping    E-Cigarette Use Never User      E-Cigarette/Vaping Substances    Nicotine No     THC No     CBD No     Flavoring No     Other No     Unknown No      Social History     Tobacco Use    Smoking status: Never Smoker    Smokeless tobacco: Never Used   Vaping Use    Vaping Use: Never used   Substance Use Topics    Alcohol use: No    Drug use: No        Review of Systems   Constitutional: Positive for activity change, chills and fatigue  Negative for fever  HENT: Positive for sore throat  Negative for congestion, ear pain, facial swelling, rhinorrhea, sinus pressure and sinus pain  Left top tooth pain   Eyes: Negative for pain, itching and visual disturbance  Respiratory: Positive for cough, chest tightness and wheezing  Negative for shortness of breath  Cardiovascular: Negative for chest pain and palpitations  Gastrointestinal: Negative for abdominal pain, constipation, diarrhea, nausea and vomiting  Genitourinary: Negative for difficulty urinating, dysuria and hematuria  Musculoskeletal: Negative for arthralgias and back pain  Skin: Negative for color change and rash  Neurological: Positive for headaches  Negative for dizziness, seizures, syncope, weakness and light-headedness  Psychiatric/Behavioral: Negative for agitation and behavioral problems  All other systems reviewed and are negative        Physical Exam  ED Triage Vitals   Temperature Pulse Respirations Blood Pressure SpO2   07/08/22 1120 07/08/22 1120 07/08/22 1120 07/08/22 1120 07/08/22 1120   99 3 °F (37 4 °C) 91 18 (!) 170/109 100 %      Temp Source Heart Rate Source Patient Position - Orthostatic VS BP Location FiO2 (%)   07/08/22 1120 07/08/22 1120 -- -- --   Oral Monitor         Pain Score       07/08/22 1340       7             Orthostatic Vital Signs  Vitals:    07/08/22 1120   BP: (!) 170/109   Pulse: 91       Physical Exam  Vitals and nursing note reviewed  Constitutional:       General: She is not in acute distress  Appearance: She is well-developed  HENT:      Head: Normocephalic and atraumatic  Right Ear: Tympanic membrane and ear canal normal       Left Ear: Tympanic membrane and ear canal normal       Mouth/Throat:      Mouth: Mucous membranes are moist       Pharynx: Uvula midline  Posterior oropharyngeal erythema present  No pharyngeal swelling  Tonsils: No tonsillar exudate or tonsillar abscesses  Eyes:      Conjunctiva/sclera: Conjunctivae normal       Pupils: Pupils are equal, round, and reactive to light  Cardiovascular:      Rate and Rhythm: Normal rate and regular rhythm  Heart sounds: No murmur heard  Pulmonary:      Effort: Pulmonary effort is normal  No respiratory distress  Breath sounds: Normal breath sounds  Abdominal:      Palpations: Abdomen is soft  Tenderness: There is no abdominal tenderness  Musculoskeletal:      Cervical back: Normal range of motion and neck supple  Skin:     General: Skin is warm and dry  Capillary Refill: Capillary refill takes less than 2 seconds  Neurological:      General: No focal deficit present  Mental Status: She is alert and oriented to person, place, and time  Psychiatric:         Mood and Affect: Mood normal          Behavior: Behavior normal          ED Medications  Medications   ipratropium-albuterol (DUO-NEB) 0 5-2 5 mg/3 mL inhalation solution 3 mL (3 mL Nebulization Given 7/8/22 1342)   predniSONE tablet 40 mg (40 mg Oral Given 7/8/22 1340)   ketorolac (TORADOL) injection 15 mg (15 mg Intramuscular Given 7/8/22 1340)       Diagnostic Studies  Results Reviewed     Procedure Component Value Units Date/Time    FLU/RSV/COVID - if FLU/RSV clinically relevant [[id number]] Collected: 07/08/22 1313    Lab Status:  In process Specimen: Nares from Nose Updated: 07/08/22 1324                 No orders to display         Procedures  Procedures      ED Course  ED Course as of 07/08/22 1402   Fri Jul 08, 2022   1225 Blood Pressure(!): 170/109   1225 Temperature: 99 3 °F (37 4 °C)   1225 Temp Source: Oral   1225 Pulse: 91   1225 Respirations: 18   1225 SpO2: 100 %   1304 Patient with what appears to be viral URI like symptoms exacerbating her asthma presenting to the emergency department for evaluation of sore throat, wheezing  Patient states that she has tried her inhaler home without symptomatic relief  Will treat patient symptomatically with a shot of Toradol, DuoNeb as well as prednisone  Will phone in a prescription for Claritin for home as well as Peridex swish and spit for her dental pain  Patient is aware she has no questions or concerns at this time will frequently re-evaluate  1355 Patient feeling much better after duoneb treatment  Informed her that somebody would be in contact with COVID flu RSV test   Advised her follow-up with her primary care doctor in a few days for re-evaluation  Informed her to use the Peridex as prescribed on the bottle  Informed her to continue Claritin over-the-counter medication for symptomatic relief  Patient is aware she has no questions or concerns at this time  Advised to follow-up with her primary care doctor in few days  Advised to come back to the hospital she develops any new, worsening or concerning symptoms  Patient understands have no questions or concerns at time patient stable for discharge  SBIRT 22yo+    Flowsheet Row Most Recent Value   SBIRT (23 yo +)    In order to provide better care to our patients, we are screening all of our patients for alcohol and drug use  Would it be okay to ask you these screening questions?  No Filed at: 07/08/2022 1230                MDM    Disposition  Final diagnoses:   Pain in gums   Asthma   URI (upper respiratory infection)     Time reflects when diagnosis was documented in both MDM as applicable and the Disposition within this note     Time User Action Codes Description Comment    7/8/2022 12:59 PM Dialoreleiia Ernesto Add [K06 8] Pain in gums     7/8/2022 12:59 PM Tim Orozco Add [P28 272] Asthma     7/8/2022 12:59 PM Tim Orozco Add [J06 9] URI (upper respiratory infection)       ED Disposition     ED Disposition   Discharge    Condition   Stable    Date/Time   Fri Jul 8, 2022  1:55 PM    Comment   Boyd Kruger discharge to home/self care  Follow-up Information     Follow up With Specialties Details Why Contact Info Additional [address], CRNP Nurse Practitioner Schedule an appointment as soon as possible for a visit  for follow up [phone]       [facility] Emergency Department Emergency Medicine Go to  As needed, If symptoms worsen [address] Emergency Department, [address] [place] [address], [address]          Discharge Medication List as of 7/8/2022  1:55 PM      START taking these medications    Details   chlorhexidine (PERIDEX) 0 12 % solution Apply 15 mL to the mouth or throat 2 (two) times a day for 10 days, Starting Fri 7/8/2022, Until Mon 7/18/2022, Normal         CONTINUE these medications which have NOT CHANGED    Details   Multiple Vitamins-Minerals (MULTIVITAMIN ADULTS PO) Take 1 tablet by mouth daily, Historical Med           No discharge procedures on file  PDMP Review     None           ED Provider  Attending physically available and evaluated Boyd Gutiérrezpatricia PEDROZA managed the patient along with the ED Attending      Electronically Signed by         Shaylee Padgett DO  07/08/22 9138

## 2022-07-08 NOTE — ED ATTENDING ATTESTATION
Final Diagnosis:  1  Pain in gums    2  Asthma    3  URI (upper respiratory infection)           Len PEDROZA MD, saw and evaluated the patient  All available labs and X-rays were ordered by me or the resident and have been reviewed by myself  I discussed the patient with the resident / non-physician and agree with the resident's / non-physician practitioner's findings and plan as documented in the resident's / non-physician practicitioner's note, except where noted  At this point, I agree with the current assessment done in the ED  I was present during key portions of all procedures performed unless otherwise stated  Chief Complaint   Patient presents with    Sore Throat     For two days, has asthma and used her inhaler but still has chest tightness    Dental Problem     Top front tooth hurts and "has a bubble" around it     This is a 32 y o  female presenting for evaluation of multiple complaints  For a couple days she has had mild asthma feeling chest tightness like typical asthma but not improving with asthma inahler so came in  Also has viral symptoms for a few days (sore throat)  For tooth 9, she had pain with a little swelling so wanted that evaluated too  No percussive tenderness no trauma that she remembers  Hx of COVID in the past      PMH:   has a past medical history of Allergic, Anxiety, Depression, Migraine, and UTI (urinary tract infection)  PSH:   has no past surgical history on file  Social:  Social History     Substance and Sexual Activity   Alcohol Use No     Social History     Tobacco Use   Smoking Status Never Smoker   Smokeless Tobacco Never Used     Social History     Substance and Sexual Activity   Drug Use No     PE:  Vitals:    07/08/22 1120   BP: (!) 170/109   Pulse: 91   Resp: 18   Temp: 99 3 °F (37 4 °C)   TempSrc: Oral   SpO2: 100%   General: VSS, NAD, awake, alert  Well-nourished, well-developed  Appears stated age     Head: Normocephalic, atraumatic, nontender  Eyes: PERRL, EOM-I  No diplopia  No hyphema  No subconjunctival hemorrhages  Symmetrical lids  ENTAtraumatic external nose and ears  MMM  Oropharynx is minimally red without exudate  No injection  Anterior tooth 9 upper gumline looks like it was poked, minor trauma noted  No percussive tenderness  Exam done concurrently with resident  No stridor  Normal phonation  No drooling  Base of mouth is soft  No mastoid tenderness  Neck: Symmetric, trachea midline  No JVD  CV: Peripheral pulses +2 throughout  No chest wall tenderness  Lungs:   Unlabored   No retractions  No crepitus  No tachypnea  No paradoxical motion  MSK:   FROM   Back:   No CVAT  Skin: Dry, intact  Neuro: AAOx3, GCS 15, CN II-XII grossly intact  Motor grossly intact  Psychiatric/Behavioral: Appropriate mood and affect   Exam: deferred  A:  - Viral syndrome ± mild asthma exacerbation  P:  - supportive measures  - discussed neb + decadron  - discussed 2 week courseo f peridex wash to sterilize area to prevent infection though doesn't look infected currently  - nothing suggesting bill's on exam      - 13 point ROS was performed and all are normal unless stated in the history above  - Nursing note reviewed  Vitals reviewed  - Orders placed by myself and/or advanced practitioner / resident     - Previous chart was reviewed  - No language barrier    - History obtained from patient  - There are no limitations to the history obtained  - Critical care time: Not applicable for this patient       Code Status: No Order  Advance Directive and Living Will:      Power of :    POLST:      Medications   predniSONE tablet 40 mg (40 mg Oral Given 7/8/22 1340)   ketorolac (TORADOL) injection 15 mg (15 mg Intramuscular Given 7/8/22 1340)     No orders to display     Orders Placed This Encounter   Procedures    FLU/RSV/COVID - if FLU/RSV clinically relevant     Labs Reviewed - No data to display  Time reflects when diagnosis was documented in both MDM as applicable and the Disposition within this note       Time User Action Codes Description Comment    7/8/2022 12:59 PM Matt Kid Add [K06 8] Pain in gums     7/8/2022 12:59 PM Matt Kid Add [Q83 373] Asthma     7/8/2022 12:59 PM Matt Quinteros Add [J06 9] URI (upper respiratory infection)           ED Disposition       ED Disposition   Discharge    Condition   Stable    Date/Time   Fri Jul 8, 2022  1:55 PM    Comment   Anisa Hard discharge to home/self care  Follow-up Information       Follow up With Specialties Details Why Contact Info Additional [address], CRNP Nurse Practitioner Schedule an appointment as soon as possible for a visit  for follow up [address]       [address] Emergency Department Emergency Medicine Go to  As needed, If symptoms worsen [address] Emergency Department, [address] [place] [address], [address]          Discharge Medication List as of 7/8/2022  1:55 PM        START taking these medications    Details   chlorhexidine (PERIDEX) 0 12 % solution Apply 15 mL to the mouth or throat 2 (two) times a day for 10 days, Starting Fri 7/8/2022, Until Mon 7/18/2022, Normal           CONTINUE these medications which have NOT CHANGED    Details   Multiple Vitamins-Minerals (MULTIVITAMIN ADULTS PO) Take 1 tablet by mouth daily, Historical Med           No discharge procedures on file  Prior to Admission Medications   Prescriptions Last Dose Informant Patient Reported? Taking? Multiple Vitamins-Minerals (MULTIVITAMIN ADULTS PO)   Yes No   Sig: Take 1 tablet by mouth daily      Facility-Administered Medications: None       Portions of the record may have been created with voice recognition software   Occasional wrong word or "sound a like" substitutions may have occurred due to the inherent limitations of voice recognition software  Read the chart carefully and recognize, using context, where substitutions have occurred      Electronically signed by:  Linn Wilson

## 2022-07-11 ENCOUNTER — TELEMEDICINE (OUTPATIENT)
Dept: FAMILY MEDICINE CLINIC | Facility: CLINIC | Age: 32
End: 2022-07-11
Payer: COMMERCIAL

## 2022-07-11 VITALS — TEMPERATURE: 101.8 F

## 2022-07-11 DIAGNOSIS — J45.20 MILD INTERMITTENT ASTHMA, UNSPECIFIED WHETHER COMPLICATED: ICD-10-CM

## 2022-07-11 DIAGNOSIS — U07.1 COVID-19 VIRUS INFECTION: Primary | ICD-10-CM

## 2022-07-11 PROCEDURE — 99214 OFFICE O/P EST MOD 30 MIN: CPT | Performed by: FAMILY MEDICINE

## 2022-07-11 RX ORDER — PREDNISONE 20 MG/1
40 TABLET ORAL DAILY
Qty: 10 TABLET | Refills: 0 | Status: SHIPPED | OUTPATIENT
Start: 2022-07-11 | End: 2022-07-16

## 2022-07-11 NOTE — PROGRESS NOTES
COVID-19 Outpatient Progress Note    Assessment/Plan:    Problem List Items Addressed This Visit        Respiratory    Asthma    Relevant Medications    predniSONE 20 mg tablet       Other    COVID-19 virus infection - Primary     Worsening  Symptoms started 7/8/2022  Tested positive 7/10/2022  Symptoms include diarrhea, cough, shortness of breath, fevers, sweats, chills  t max 103 8F  Patient had gum swelling and oral rash after starting paxlovid  Stop medication and monitor for improvement  Continue symptomatic treatments, rest, hydration  With history of asthma I recommend starting a prednisone burst 40mg daily for 5 days             Relevant Medications    predniSONE 20 mg tablet         Disposition:     Patient has COVID-19 infection  Based off CDC guidelines, they were recommended to isolate for 5 days from the date of the positive test  If they remain asymptomatic, isolation may be ended followed by 5 days of wearing a mask when around othes to minimize risk of infecting others  If they have a fever, continue to stay home until fever resolves for at least 24 hours  Discussed symptom directed medication options with patient  Discussed vitamin D, vitamin C, and/or zinc supplementation with patient  Discussed risks, benefits, and side effects of inhaled corticosteroids and they agree to treatment  I have spent 15 minutes directly with the patient  Greater than 50% of this time was spent in counseling/coordination of care regarding: diagnostic results, prognosis, risks and benefits of treatment options, instructions for management, risk factor reductions and impressions  Encounter provider Raf Cook 1257, DO    Provider located at [facility] AT [address] [facility]  [address] [place] [id number]    Recent Visits  No visits were found meeting these conditions    Showing recent visits within past 7 days and meeting all other requirements  Today's Visits  Date Type Provider Dept   07/11/22 Telemedicine Greg Damon DO [facility] [address]    Showing today's visits and meeting all other requirements  Future Appointments  No visits were found meeting these conditions  Showing future appointments within next 150 days and meeting all other requirements     This virtual check-in was done via [employer] and patient was informed that this is a secure, HIPAA-compliant platform  She agrees to proceed  Patient agrees to participate in a virtual check in via telephone or video visit instead of presenting to the office to address urgent/immediate medical needs  Patient is aware this is a billable service  After connecting through [facility], the patient was identified by name and date of birth  Osbaldo Tovar was informed that this was a telemedicine visit and that the exam was being conducted confidentially over secure lines  My office door was closed  No one else was in the room  Osbaldo Tovar acknowledged consent and understanding of privacy and security of the telemedicine visit  I informed the patient that I have reviewed her record in Epic and presented the opportunity for her to ask any questions regarding the visit today  The patient agreed to participate  Verification of patient location:  Patient is located in the following state in which I hold an active license: [place]    Subjective:   Osbaldo Tovar is a 32 y o  female who has been screened for COVID-19  Symptom change since last report: worsening  Patient's symptoms include fever, chills, fatigue, malaise, nasal congestion, rhinorrhea, sore throat, cough, shortness of breath, chest tightness, diarrhea, myalgias and headache  Patient denies anosmia, loss of taste, abdominal pain, nausea and vomiting      - Date of symptom onset: 7/8/2022  - Date of positive COVID-19 test: 7/8/2022  Type of test: PCR       COVID-19 vaccination status: Fully vaccinated (primary series)    Ko Butler has been staying home and has isolated themselves in her home  She is taking care to not share personal items and is cleaning all surfaces that are touched often, like counters, tabletops, and doorknobs using household cleaning sprays or wipes  She is wearing a mask when she leaves her room  tylenol    Lab Results   Component Value Date    SARSCOV2 Positive (A) 07/08/2022    SARSCOV2 Detected (A) 04/29/2020     Past Medical History:   Diagnosis Date    Allergic     Anxiety     Depression     Migraine     UTI (urinary tract infection)      No past surgical history on file  Current Outpatient Medications   Medication Sig Dispense Refill    Multiple Vitamins-Minerals (MULTIVITAMIN ADULTS PO) Take 1 tablet by mouth daily      predniSONE 20 mg tablet Take 2 tablets (40 mg total) by mouth daily for 5 days 10 tablet 0    chlorhexidine (PERIDEX) 0 12 % solution Apply 15 mL to the mouth or throat 2 (two) times a day for 10 days 300 mL 0     No current facility-administered medications for this visit  No Known Allergies    Review of Systems   Constitutional: Positive for chills, fatigue and fever  Negative for activity change and diaphoresis  HENT: Positive for congestion, rhinorrhea and sore throat  Negative for ear pain, hearing loss, postnasal drip, sinus pressure, sinus pain and sneezing  Respiratory: Positive for cough, chest tightness and shortness of breath  Negative for wheezing  Cardiovascular: Negative for chest pain, palpitations and leg swelling  Gastrointestinal: Positive for diarrhea  Negative for abdominal pain, blood in stool, constipation, nausea and vomiting  Genitourinary: Negative for dysuria, frequency, hematuria and urgency  Musculoskeletal: Positive for myalgias  Negative for arthralgias  Neurological: Positive for headaches  Negative for dizziness, syncope, weakness, light-headedness and numbness       Objective:    Vitals:    07/11/22 1607 Temp: (!) 101 8 °F (38 8 °C)   TempSrc: Oral       Physical Exam  Vitals reviewed  Constitutional:       General: She is not in acute distress  Appearance: She is ill-appearing  She is not toxic-appearing or diaphoretic  HENT:      Head: Normocephalic and atraumatic  Right Ear: External ear normal       Left Ear: External ear normal       Nose: Nose normal  No congestion or rhinorrhea  Mouth/Throat:      Mouth: Mucous membranes are moist       Pharynx: Oropharynx is clear  Eyes:      General: No scleral icterus  Right eye: No discharge  Left eye: No discharge  Conjunctiva/sclera: Conjunctivae normal    Pulmonary:      Effort: No respiratory distress  Skin:     Coloration: Skin is not pale  Findings: No erythema  Neurological:      Mental Status: She is alert  Psychiatric:         Mood and Affect: Mood normal          Thought Content: Thought content normal          VIRTUAL VISIT DISCLAIMER    Aram Frankel verbally agrees to participate in [employer]  Pt is aware that [employer] could be limited without vital signs or the ability to perform a full hands-on physical exam  Sherry Pritchard understands she or the provider may request at any time to terminate the video visit and request the patient to seek care or treatment in person

## 2022-07-11 NOTE — ASSESSMENT & PLAN NOTE
Worsening  · Symptoms started 7/8/2022  · Tested positive 7/10/2022  · Symptoms include diarrhea, cough, shortness of breath, fevers, sweats, chills  · t max 103 8F  · Patient had gum swelling and oral rash after starting paxlovid   Stop medication and monitor for improvement  · Continue symptomatic treatments, rest, hydration  · With history of asthma I recommend starting a prednisone burst 40mg daily for 5 days

## 2022-07-15 ENCOUNTER — TELEPHONE (OUTPATIENT)
Dept: NEUROLOGY | Facility: CLINIC | Age: 32
End: 2022-07-15

## 2022-07-15 NOTE — TELEPHONE ENCOUNTER
Called and left a voicemail for patient - Please call back to confirm upcoming appointment with [facility]  Provided patient with apt date, time and location  Informed patient that check in is at least 15 minutes prior to apt time

## 2022-07-21 ENCOUNTER — OFFICE VISIT (OUTPATIENT)
Dept: NEUROLOGY | Facility: CLINIC | Age: 32
End: 2022-07-21
Payer: COMMERCIAL

## 2022-07-21 VITALS
OXYGEN SATURATION: 98 % | DIASTOLIC BLOOD PRESSURE: 78 MMHG | WEIGHT: 213 LBS | SYSTOLIC BLOOD PRESSURE: 128 MMHG | BODY MASS INDEX: 37.74 KG/M2 | TEMPERATURE: 97.8 F | HEART RATE: 100 BPM | HEIGHT: 63 IN

## 2022-07-21 DIAGNOSIS — G43.009 MIGRAINE WITHOUT AURA AND WITHOUT STATUS MIGRAINOSUS, NOT INTRACTABLE: Primary | ICD-10-CM

## 2022-07-21 DIAGNOSIS — R51.9 NONINTRACTABLE HEADACHE, UNSPECIFIED CHRONICITY PATTERN, UNSPECIFIED HEADACHE TYPE: ICD-10-CM

## 2022-07-21 PROCEDURE — 99203 OFFICE O/P NEW LOW 30 MIN: CPT | Performed by: NURSE PRACTITIONER

## 2022-07-21 RX ORDER — METOCLOPRAMIDE 5 MG/1
5 TABLET ORAL AS NEEDED
Qty: 15 TABLET | Refills: 2 | Status: SHIPPED | OUTPATIENT
Start: 2022-07-21

## 2022-07-21 RX ORDER — SUMATRIPTAN 50 MG/1
50 TABLET, FILM COATED ORAL ONCE AS NEEDED
Qty: 9 TABLET | Refills: 3 | Status: SHIPPED | OUTPATIENT
Start: 2022-07-21

## 2022-07-21 NOTE — PROGRESS NOTES
Patient ID: Arben Blanco is a 32 y o  female  Assessment/Plan:  Patient Instructions:  Magnesium oxide 400mg once daily for migraine prevention  Can continue with as needed ibuprofen but would benefit from reducing use of this so will add imitrex at higher dose and reglan to help with acute management of migraine  The imitrex should be taken at onset of migraine and then can be taken again 2 hours later if headache is not gone  Do not use imitrex or reglan more than 3x/week  Log migraines with migraine buddy kerri  Make sure to stay well hydrated like you have been  Follow up in 3 months  Discussed importance of primary care follow up if needing to use albuterol daily  Future options for migraine treatment could include use of flexeril and/or a different triptan  Avoiding certain preventative medications with the thought she may want to get pregnant in the not so distant future  Diagnoses and all orders for this visit:    Migraine without aura and without status migrainosus, not intractable  -     SUMAtriptan (Imitrex) 50 mg tablet; Take 1 tablet (50 mg total) by mouth once as needed for migraine  -     metoclopramide (REGLAN) 5 mg tablet; Take 1 tablet (5 mg total) by mouth if needed (migraine) Up to two tablets in one day  Do not take more than 3 times per week  Nonintractable headache, unspecified chronicity pattern, unspecified headache type  -     Ambulatory Referral to Neurology      Subjective:    IARM  Anupama Nicole is a 32year old female with past medical history of irregular menses, asthma needing to use albuterol daily, recent COVID infection (states has recovered well), migraines for past 5 years, diverticulitis, depression/anxiety used to be on zoloft but doing better now who presents for new patient evaluation of headaches  She has family history of heart disease in her mother/father but no personal history of such; her aunt has migraines   She currently lives with her boyfriend, she is sexually active but not using any birth control at this time  She works at [employer] as a CNA  She denies significant stressors  She sleeps well unless she has a headache; she uses minimal caffeine, and she hydrates with 6-8 bottles water/day  The location of her headache changes- mostly left sided either temporal/frontal or occipital  It occurs 2x/week  Can last from a few hours to all day  Described as pressure/throbbing pain  It can be debilitating and she has called out of work  She has tried topamax in past with cognitive side effects (this was for weight loss reasons and was also on phentermine at the time)  She has tried imitrex at low dose in the past and she states no side effects but just didn't work-of note she did not try more than 25mg per day  She states currently she uses ibuprofen (tries to limit use) and sleep to help headaches  She has been in the ED in the past for headaches and she gets relief from the migraine cocktail  CT head 2015:  FINDINGS-   PARENCHYMA-  No intracranial mass, mass effect or midline shift  No   acute intracranial hemorrhage  No CT signs of acute infarction  VENTRICLES AND EXTRA-AXIAL SPACES-  Normal for patient's age  VISUALIZED ORBITS AND PARANASAL SINUSES-  Unremarkable  CALVARIUM AND EXTRACRANIAL SOFT TISSUES-   Normal    IMPRESSION-   No acute intracranial abnormality  EKG 7/10/2022:  Sinus tachycardia rate 114, Qtc 435    Recent labs:  H/H 13 6/39 1  WBC 8 9  Creat 0 92  Na 139  K3 6  AST/ALT 27/32      The following portions of the patient's history were reviewed and updated as appropriate: allergies, current medications, past family history, past medical history, past social history, past surgical history and problem list          Objective:    Blood pressure 128/78, pulse 100, temperature 97 8 °F (36 6 °C), temperature source Temporal, height 5' 2 75" (1 594 m), weight 96 6 kg (213 lb), SpO2 98 %  Physical Exam  Vitals reviewed  Constitutional:       General: She is not in acute distress  Appearance: She is obese  She is not ill-appearing, toxic-appearing or diaphoretic  HENT:      Head: Normocephalic and atraumatic  Comments: No significant scalp or TMJ tenderness, she does have L>R occipital notch tenderness     Right Ear: External ear normal       Left Ear: External ear normal       Mouth/Throat:      Mouth: Mucous membranes are moist       Pharynx: Oropharynx is clear  Comments: Oropharynx is crowded  Eyes:      General: Lids are normal       Extraocular Movements: Extraocular movements intact  Pupils: Pupils are equal, round, and reactive to light  Cardiovascular:      Rate and Rhythm: Normal rate and regular rhythm  Pulses: Normal pulses  Heart sounds: Normal heart sounds  Pulmonary:      Effort: Pulmonary effort is normal       Breath sounds: Normal breath sounds  Abdominal:      General: There is no distension  Musculoskeletal:         General: Normal range of motion  Cervical back: Normal range of motion and neck supple  No rigidity  Right lower leg: No edema  Left lower leg: No edema  Skin:     General: Skin is warm  Capillary Refill: Capillary refill takes less than 2 seconds  Findings: No rash  Neurological:      General: No focal deficit present  Mental Status: She is alert  Deep Tendon Reflexes: Strength normal and reflexes are normal and symmetric  Psychiatric:         Mood and Affect: Mood normal          Speech: Speech normal          Behavior: Behavior normal          Neurological Exam  Mental Status  Alert  Speech is normal  Language is fluent with no aphasia  Attention and concentration are normal  Fund of knowledge is appropriate for level of education  Cranial Nerves  CN II: Visual acuity is normal  Visual fields full to confrontation  CN III, IV, VI: Extraocular movements intact bilaterally  Normal lids and orbits bilaterally   Pupils equal round and reactive to light bilaterally  CN V: Facial sensation is normal   CN VII: Full and symmetric facial movement  CN VIII: Hearing is normal   CN IX, X: Palate elevates symmetrically  Normal gag reflex  CN XI: Shoulder shrug strength is normal   CN XII: Tongue midline without atrophy or fasciculations  Motor  Normal muscle bulk throughout  Normal muscle tone  No abnormal involuntary movements  Strength is 5/5 throughout all four extremities  Sensory  Light touch is normal in upper and lower extremities  Reflexes  Deep tendon reflexes are 2+ and symmetric in all four extremities  Coordination  Right: Finger-to-nose normal Left: Finger-to-nose normal     Gait  Normal casual, toe, heel and tandem gait  ROS:    Review of Systems   Constitutional: Negative  Negative for appetite change and fever  HENT: Negative  Negative for hearing loss, tinnitus, trouble swallowing and voice change  Eyes: Positive for visual disturbance  Negative for photophobia and pain  Respiratory: Negative  Negative for shortness of breath  Cardiovascular: Negative  Negative for palpitations  Gastrointestinal: Positive for nausea  Negative for vomiting  Endocrine: Negative  Negative for cold intolerance  Genitourinary: Negative  Negative for dysuria, frequency and urgency  Musculoskeletal: Negative  Negative for myalgias and neck pain  Skin: Negative  Negative for rash  Neurological: Positive for headaches  Negative for dizziness, tremors, seizures, syncope, facial asymmetry, speech difficulty, weakness, light-headedness and numbness  Hematological: Negative  Does not bruise/bleed easily  Psychiatric/Behavioral: Negative  Negative for confusion, hallucinations and sleep disturbance     ROS was reviewed and updated as appropriate

## 2022-07-21 NOTE — PATIENT INSTRUCTIONS
Magnesium oxide 400mg once daily for migraine prevention  Can continue with as needed ibuprofen but would benefit from reducing use of this so will add imitrex at higher dose and reglan to help with acute management of migraine  The imitrex should be taken at onset of migraine and then can be taken again 2 hours later if headache is not gone  Do not use imitrex or reglan more than 3x/week  Log migraines with migraine buddy kerri  Make sure to stay well hydrated like you have been  Follow up in 3 months  Migraine Headache   AMBULATORY CARE:   A migraine headache  is a severe headache  The pain can be so severe that it interferes with your daily activities  A migraine can last a few hours up to several days  The exact cause of migraines is not known  A family history of migraines increases your risk  Your risk is also higher if you are a woman or take medicines such as estrogen or a vasodilator  Common warning signs include the following:  Warning signs usually start 15 to 60 minutes before the headache:  Visual changes (auras), such as blurred vision, temporary blind or bright spots, lines, or hallucinations    Unusual tiredness or frequent yawning    Tingling in an arm or leg    Signs and symptoms of a migraine headache:  A migraine headache usually begins as a dull ache around the eye or temple  The pain may get worse with movement  You may also have the following:  Pain in your head that may increase to the point that you cannot do everyday activities    Pain on one or both sides of your head    Throbbing, pulsing, or pounding pain in your head    Nausea and vomiting    Sensitivity to light, noise, or smells    Call your local emergency number (911 in the [address] [place] [address],[address]) or have someone call if:   You feel like you are going to faint, you become confused, or you have a seizure  Seek care immediately if:   You have a headache that seems different or much worse than your usual migraine headache      You have a severe headache with a fever or a stiff neck  You have new problems with speech, vision, balance, or movement  Call your doctor or neurologist if:   You have a fever  Your migraines interfere with your daily activities  Your medicines or treatments stop working  You have questions about your condition or care  Treatment:  Migraines cannot be cured  The goal of treatment is to reduce your symptoms  Take medicine as soon as you feel a migraine begin, or as directed  The following may be used to manage migraines:  Medicines  may be given to prevent or treat migraines  Take medicine to prevent migraines as soon as you feel a migraine begin, or as directed  Your healthcare provider may recommend any of the following:    Migraine medicines  are used to help prevent or stop a migraine  NSAIDs  help decrease swelling and pain or fever  This medicine is available with or without a doctor's order  NSAIDs can cause stomach bleeding or kidney problems in certain people  If you take blood thinner medicine, always ask your healthcare provider if NSAIDs are safe for you  Always read the medicine label and follow directions  Acetaminophen  decreases pain and fever  It is available without a doctor's order  Ask how much to take and how often to take it  Follow directions  Read the labels of all other medicines you are using to see if they also contain acetaminophen, or ask your doctor or pharmacist  Acetaminophen can cause liver damage if not taken correctly  Do not use more than 4 grams (4,000 milligrams) total of acetaminophen in one day  Prescription pain medicine  may be given  Ask your healthcare provider how to take this medicine safely  Some prescription pain medicines contain acetaminophen  Do not take other medicines that contain acetaminophen without talking to your healthcare provider  Too much acetaminophen may cause liver damage  Prescription pain medicine may cause constipation   Ask your healthcare provider how to prevent or treat constipation  Antinausea medicine  may be given to calm your stomach and to help prevent vomiting  This medicine can also help relieve pain  Cognitive behavior therapy (CBT)  can help you learn ways to manage and prevent migraines  A therapist can teach you to relax and to reduce stress  You may learn ways to create healthy nutrition, activity, and sleep habits to prevent migraines  The therapist can also help you manage conditions that can affect migraines, such as anxiety or depression  Common triggers for a migraine include the following:   Stress, eye strain, oversleeping, or not getting enough sleep    Hormone changes in women from birth control pills, pregnancy, menopause, or during a monthly period    Skipping meals, going too long without eating, or not drinking enough liquids    Certain foods or drinks such as chocolate, hard cheese, alcohol, or drinks that contain caffeine    Foods that contain gluten, nitrates, MSG, or artificial sweeteners    Sunlight, bright or flashing lights, loud noises, smoke, or strong smells    Heat, humidity, or changes in the weather    Manage your symptoms:   Rest in a dark, quiet room  This will help decrease your pain  Sleep may also help relieve the pain  Apply ice to decrease pain  Use an ice pack, or put crushed ice in a plastic bag  Cover the ice pack with a towel and place it on your head where it hurts for 15 to 20 minutes every hour  Apply heat to decrease pain and muscle spasms  Use a small towel dampened with warm water or a heating pad, or sit in a warm bath  Apply heat on the area for 20 to 30 minutes every 2 hours  You may alternate heat and ice  Keep a migraine record  Write down when your migraines start and stop  Include your symptoms and what you were doing when a migraine began  Record what you ate or drank for 24 hours before the migraine started   Keep track of what you did to treat your migraine and if it worked  Prevent another migraine headache:   Prevent a medicine overuse headache  Take pain medicines only as long as directed  A medicine may be limited to a certain amount each month  Your healthcare provider can help you create a plan so you get a safe amount each month  Do not smoke  Nicotine and other chemicals in cigarettes and cigars can trigger a migraine and also cause lung damage  Ask your healthcare provider for information if you currently smoke and need help to quit  E-cigarettes or smokeless tobacco still contain nicotine  Talk to your healthcare provider before you use these products  Do not drink alcohol  Alcohol can trigger a migraine  It can also interfere with the medicines used to treat your migraine  Be physically active  Physical activity, such as exercise, may help prevent migraines  Talk to your healthcare provider about the best activity plan for you  Try to get at least 30 minutes of physical activity on most days  Manage stress  Stress may trigger a migraine  Learn new ways to relax, such as deep breathing  Follow a sleep schedule  Go to bed and get up at the same time each day  Eat a variety of healthy foods  Healthy foods include fruit, vegetables, whole-grain breads, low-fat dairy products, beans, lean meats, and fish  Do not have foods or drinks that trigger your migraines  Drink more liquids to prevent dehydration  Your healthcare provider can tell you how much liquid to drink each day and which liquids are best for you  Follow up with your doctor or neurologist as directed:  Bring your migraine record with you  Write down your questions so you remember to ask them during your visits  © Copyright [employer] 2022 Information is for End User's use only and may not be sold, redistributed or otherwise used for commercial purposes   All illustrations and images included in CareNotes® are the copyrighted property of [employer]  or [facility]  The above information is an  only  It is not intended as medical advice for individual conditions or treatments  Talk to your doctor, nurse or pharmacist before following any medical regimen to see if it is safe and effective for you

## 2022-10-26 ENCOUNTER — TELEPHONE (OUTPATIENT)
Dept: NEUROLOGY | Facility: CLINIC | Age: 32
End: 2022-10-26

## 2022-10-26 NOTE — TELEPHONE ENCOUNTER
Called and left a voicemail for patient - Please call back to confirm upcoming appointment with [facility]  Provided patient with apt date, time and location  Informed patient that check in is at least 15 minutes prior to apt time

## 2022-11-21 ENCOUNTER — APPOINTMENT (EMERGENCY)
Dept: RADIOLOGY | Facility: HOSPITAL | Age: 32
End: 2022-11-21

## 2022-11-21 ENCOUNTER — HOSPITAL ENCOUNTER (EMERGENCY)
Facility: HOSPITAL | Age: 32
Discharge: HOME | End: 2022-11-21
Attending: EMERGENCY MEDICINE

## 2022-11-21 VITALS
DIASTOLIC BLOOD PRESSURE: 101 MMHG | HEART RATE: 96 BPM | RESPIRATION RATE: 20 BRPM | OXYGEN SATURATION: 99 % | SYSTOLIC BLOOD PRESSURE: 164 MMHG | TEMPERATURE: 98.2 F

## 2022-11-21 DIAGNOSIS — R00.2 PALPITATIONS: Primary | ICD-10-CM

## 2022-11-21 DIAGNOSIS — R42 LIGHTHEADEDNESS: ICD-10-CM

## 2022-11-21 LAB
ALBUMIN SERPL BCP-MCNC: 3.2 G/DL (ref 3.5–5)
ALP SERPL-CCNC: 79 U/L (ref 46–116)
ALT SERPL W P-5'-P-CCNC: 29 U/L (ref 12–78)
ANION GAP SERPL CALCULATED.3IONS-SCNC: 6 MMOL/L (ref 4–13)
AST SERPL W P-5'-P-CCNC: 33 U/L (ref 5–45)
ATRIAL RATE: 101 BPM
BASOPHILS # BLD AUTO: 0.1 THOUSANDS/ÂΜL (ref 0–0.1)
BASOPHILS NFR BLD AUTO: 1 % (ref 0–1)
BILIRUB SERPL-MCNC: 0.54 MG/DL (ref 0.2–1)
BUN SERPL-MCNC: 15 MG/DL (ref 5–25)
CALCIUM ALBUM COR SERPL-MCNC: 9.5 MG/DL (ref 8.3–10.1)
CALCIUM SERPL-MCNC: 8.9 MG/DL (ref 8.3–10.1)
CHLORIDE SERPL-SCNC: 107 MMOL/L (ref 96–108)
CO2 SERPL-SCNC: 23 MMOL/L (ref 21–32)
CREAT SERPL-MCNC: 0.78 MG/DL (ref 0.6–1.3)
EOSINOPHIL # BLD AUTO: 0.12 THOUSAND/ÂΜL (ref 0–0.61)
EOSINOPHIL NFR BLD AUTO: 1 % (ref 0–6)
ERYTHROCYTE [DISTWIDTH] IN BLOOD BY AUTOMATED COUNT: 12.3 % (ref 11.6–15.1)
EXT PREGNANCY TEST URINE: NEGATIVE
EXT. CONTROL: NORMAL
GFR SERPL CREATININE-BSD FRML MDRD: 100 ML/MIN/1.73SQ M
GLUCOSE SERPL-MCNC: 124 MG/DL (ref 65–140)
HCT VFR BLD AUTO: 42.2 % (ref 34.8–46.1)
HGB BLD-MCNC: 14.1 G/DL (ref 11.5–15.4)
IMM GRANULOCYTES # BLD AUTO: 0.03 THOUSAND/UL (ref 0–0.2)
IMM GRANULOCYTES NFR BLD AUTO: 0 % (ref 0–2)
LYMPHOCYTES # BLD AUTO: 2.78 THOUSANDS/ÂΜL (ref 0.6–4.47)
LYMPHOCYTES NFR BLD AUTO: 29 % (ref 14–44)
MCH RBC QN AUTO: 29.2 PG (ref 26.8–34.3)
MCHC RBC AUTO-ENTMCNC: 33.4 G/DL (ref 31.4–37.4)
MCV RBC AUTO: 87 FL (ref 82–98)
MONOCYTES # BLD AUTO: 0.57 THOUSAND/ÂΜL (ref 0.17–1.22)
MONOCYTES NFR BLD AUTO: 6 % (ref 4–12)
NEUTROPHILS # BLD AUTO: 5.99 THOUSANDS/ÂΜL (ref 1.85–7.62)
NEUTS SEG NFR BLD AUTO: 63 % (ref 43–75)
NRBC BLD AUTO-RTO: 0 /100 WBCS
P AXIS: 32 DEGREES
PLATELET # BLD AUTO: 378 THOUSANDS/UL (ref 149–390)
PMV BLD AUTO: 11.6 FL (ref 8.9–12.7)
POTASSIUM SERPL-SCNC: 4.4 MMOL/L (ref 3.5–5.3)
PR INTERVAL: 142 MS
PROT SERPL-MCNC: 7.9 G/DL (ref 6.4–8.4)
QRS AXIS: 36 DEGREES
QRSD INTERVAL: 76 MS
QT INTERVAL: 336 MS
QTC INTERVAL: 435 MS
RBC # BLD AUTO: 4.83 MILLION/UL (ref 3.81–5.12)
SODIUM SERPL-SCNC: 136 MMOL/L (ref 135–147)
T WAVE AXIS: 12 DEGREES
TSH SERPL DL<=0.05 MIU/L-ACNC: 0.93 UIU/ML (ref 0.45–4.5)
VENTRICULAR RATE: 101 BPM
WBC # BLD AUTO: 9.59 THOUSAND/UL (ref 4.31–10.16)

## 2022-11-21 RX ORDER — LORAZEPAM 0.5 MG/1
0.5 TABLET ORAL ONCE
Status: COMPLETED | OUTPATIENT
Start: 2022-11-21 | End: 2022-11-21

## 2022-11-21 RX ADMIN — LORAZEPAM 0.5 MG: 0.5 TABLET ORAL at 12:37

## 2022-11-21 NOTE — ED PROVIDER NOTES
History  Chief Complaint   Patient presents with   • Palpitations     Pt c/o palpitations, lightheadedness, and nausea at work today  Pt reports her blood pressure was high today at work as well      Patient is a 27-year-old female presenting for palpitations, lightheadedness, and nausea  Past medical history is significant for anxiety, depression, migraine  No cardiac history  Patient states approximately an hour prior to presentation to the hospital she began experiencing lightheadedness dizziness as well as palpitations  Patient states she took her blood pressure and heart rate while she was at work and noted they were elevated  Patient also endorses some nausea but denies any vomiting  Patient denies fevers/chills, chest pain, abdominal pain, shortness of breath, denies diarrhea, denies any sick contacts  On initial presentation, patient's hypertensive and tachycardic  On exam patient while tachycardic has regular rhythm, lungs clear by auscultation, normoactive bowel sounds nontender abdomen  Patient has no focal neurologic deficits  Prior to Admission Medications   Prescriptions Last Dose Informant Patient Reported? Taking? Multiple Vitamins-Minerals (MULTIVITAMIN ADULTS PO)   Yes No   Sig: Take 1 tablet by mouth daily   SUMAtriptan (Imitrex) 50 mg tablet   No No   Sig: Take 1 tablet (50 mg total) by mouth once as needed for migraine   metoclopramide (REGLAN) 5 mg tablet   No No   Sig: Take 1 tablet (5 mg total) by mouth if needed (migraine) Up to two tablets in one day  Do not take more than 3 times per week  Facility-Administered Medications: None       Past Medical History:   Diagnosis Date   • Allergic    • Anxiety    • Depression    • Migraine    • UTI (urinary tract infection)        History reviewed  No pertinent surgical history      Family History   Problem Relation Age of Onset   • Hypertension Mother    • Hypertension Father    • Coronary artery disease Father    • Hyperlipidemia Father    • Diabetes Father    • Breast cancer Cousin    • Colon cancer Cousin    • Ovarian cancer Cousin      I have reviewed and agree with the history as documented  E-Cigarette/Vaping   • E-Cigarette Use Never User      E-Cigarette/Vaping Substances   • Nicotine No    • THC No    • CBD No    • Flavoring No    • Other No    • Unknown No      Social History     Tobacco Use   • Smoking status: Never   • Smokeless tobacco: Never   Vaping Use   • Vaping Use: Never used   Substance Use Topics   • Alcohol use: Yes     Comment: Social   • Drug use: No        Review of Systems   Constitutional: Negative  HENT: Negative  Eyes: Negative  Respiratory: Negative  Cardiovascular: Positive for palpitations  Negative for chest pain  Gastrointestinal: Positive for nausea  Negative for abdominal pain, diarrhea and vomiting  Endocrine: Negative  Genitourinary: Negative  Musculoskeletal: Negative  Skin: Negative  Allergic/Immunologic: Negative  Neurological: Positive for dizziness and light-headedness  Negative for headaches  Hematological: Negative  Psychiatric/Behavioral: Negative  Physical Exam  ED Triage Vitals [11/21/22 1158]   Temperature Pulse Respirations Blood Pressure SpO2   98 2 °F (36 8 °C) 98 18 (!) 193/146 100 %      Temp Source Heart Rate Source Patient Position - Orthostatic VS BP Location FiO2 (%)   Oral Monitor Lying Right arm --      Pain Score       No Pain             Orthostatic Vital Signs  Vitals:    11/21/22 1158 11/21/22 1215 11/21/22 1323   BP: (!) 193/146 (!) 174/107 (!) 164/101   Pulse: 98 100 96   Patient Position - Orthostatic VS: Lying Lying Sitting       Physical Exam  Vitals and nursing note reviewed  Constitutional:       Appearance: Normal appearance  She is obese  HENT:      Head: Normocephalic and atraumatic        Right Ear: Tympanic membrane, ear canal and external ear normal       Left Ear: Tympanic membrane, ear canal and external ear normal       Nose: Nose normal       Mouth/Throat:      Mouth: Mucous membranes are moist       Pharynx: Oropharynx is clear  Eyes:      Extraocular Movements: Extraocular movements intact  Conjunctiva/sclera: Conjunctivae normal       Pupils: Pupils are equal, round, and reactive to light  Cardiovascular:      Rate and Rhythm: Regular rhythm  Tachycardia present  Pulses: Normal pulses  Heart sounds: Normal heart sounds  Pulmonary:      Effort: Pulmonary effort is normal       Breath sounds: Normal breath sounds  Abdominal:      General: Abdomen is flat  Bowel sounds are normal       Palpations: Abdomen is soft  Musculoskeletal:         General: Normal range of motion  Cervical back: Normal range of motion and neck supple  Skin:     General: Skin is warm and dry  Capillary Refill: Capillary refill takes less than 2 seconds  Neurological:      General: No focal deficit present  Mental Status: She is alert and oriented to person, place, and time  Psychiatric:         Mood and Affect: Mood normal          Behavior: Behavior normal          Thought Content: Thought content normal          Judgment: Judgment normal          ED Medications  Medications   LORazepam (ATIVAN) tablet 0 5 mg (0 5 mg Oral Given 11/21/22 1237)       Diagnostic Studies  Results Reviewed     Procedure Component Value Units Date/Time    POCT pregnancy, urine [[id number]]  (Normal) Resulted: 11/21/22 1356    Lab Status: Final result Updated: 11/21/22 1356     EXT Preg Test, Ur Negative     Control Valid    TSH [[id number]]  (Normal) Collected: 11/21/22 1237    Lab Status: Final result Specimen: Blood from Arm, Right Updated: 11/21/22 1314     TSH 3RD GENERATON 0 925 uIU/mL     Narrative:      Patients undergoing fluorescein dye angiography may retain small amounts of fluorescein in the body for 48-72 hours post procedure  Samples containing fluorescein can produce falsely depressed TSH values  If the patient had this procedure,a specimen should be resubmitted post fluorescein clearance        Comprehensive metabolic panel [[id number]]  (Abnormal) Collected: 11/21/22 1237    Lab Status: Final result Specimen: Blood from Arm, Right Updated: 11/21/22 1308     Sodium 136 mmol/L      Potassium 4 4 mmol/L      Chloride 107 mmol/L      CO2 23 mmol/L      ANION GAP 6 mmol/L      BUN 15 mg/dL      Creatinine 0 78 mg/dL      Glucose 124 mg/dL      Calcium 8 9 mg/dL      Corrected Calcium 9 5 mg/dL      AST 33 U/L      ALT 29 U/L      Alkaline Phosphatase 79 U/L      Total Protein 7 9 g/dL      Albumin 3 2 g/dL      Total Bilirubin 0 54 mg/dL      eGFR 100 ml/min/1 73sq m     Narrative:      National Kidney Disease Foundation guidelines for Chronic Kidney Disease (CKD):   •  Stage 1 with normal or high GFR (GFR > 90 mL/min/1 73 square meters)  •  Stage 2 Mild CKD (GFR = 60-89 mL/min/1 73 square meters)  •  Stage 3A Moderate CKD (GFR = 45-59 mL/min/1 73 square meters)  •  Stage 3B Moderate CKD (GFR = 30-44 mL/min/1 73 square meters)  •  Stage 4 Severe CKD (GFR = 15-29 mL/min/1 73 square meters)  •  Stage 5 End Stage CKD (GFR <15 mL/min/1 73 square meters)  Note: GFR calculation is accurate only with a steady state creatinine    CBC and differential [[id number]] Collected: 11/21/22 1237    Lab Status: Final result Specimen: Blood from Arm, Right Updated: 11/21/22 1252     WBC 9 59 Thousand/uL      RBC 4 83 Million/uL      Hemoglobin 14 1 g/dL      Hematocrit 42 2 %      MCV 87 fL      MCH 29 2 pg      MCHC 33 4 g/dL      RDW 12 3 %      MPV 11 6 fL      Platelets 922 Thousands/uL      nRBC 0 /100 WBCs      Neutrophils Relative 63 %      Immat GRANS % 0 %      Lymphocytes Relative 29 %      Monocytes Relative 6 %      Eosinophils Relative 1 %      Basophils Relative 1 %      Neutrophils Absolute 5 99 Thousands/µL      Immature Grans Absolute 0 03 Thousand/uL      Lymphocytes Absolute 2 78 Thousands/µL      Monocytes Absolute 0 57 Thousand/µL      Eosinophils Absolute 0 12 Thousand/µL      Basophils Absolute 0 10 Thousands/µL                  XR chest 2 views   Final Result by Saranya Lopez MD (11/21 1249)   No acute cardiopulmonary findings  Workstation performed: [id number]               Procedures  Procedures      ED Course  ED Course as of 11/21/22 1515   Mon Nov 21, 2022   1243 Procedure Note: EKG  Date/Time: 11/21/22 12:43 PM   Interpreted by: Ken Stone  Indications / Diagnosis: Palpitations  ECG reviewed by me, the ED Provider: yes   The EKG demonstrates:  Rhythm: normal sinus  Intervals: normal intervals, mild tachycardia (HR of 101)  Axis: normal axis  QRS/Blocks: normal QRS  ST Changes: No acute ST Changes, no STD/MARIA T        1315 TSH 3RD GENERATON: 0 925   1402 PREGNANCY TEST URINE: Negative                             SBIRT 20yo+    Flowsheet Row Most Recent Value   SBIRT (23 yo +)    In order to provide better care to our patients, we are screening all of our patients for alcohol and drug use  Would it be okay to ask you these screening questions? Unable to answer at this time Filed at: 11/21/2022 1304                MDM  Number of Diagnoses or Management Options  Lightheadedness: new and requires workup  Palpitations: new and requires workup  Diagnosis management comments: Patient 27 yo female presenting for palpitations, lightheadedness, nausea  Ddx: Anxiety, arrhythmia, pregnancy, hyperthyroidism  Highest concern at this time is for arrhythmia and anxiety based on patient presentation and symptoms  Will obtain EKG, CMP, CBC  Will obtain TSH to evaluate for hypothyroidism  Will also obtain urine preg to evaluate for pregnancy has symptoms of nausea and palpitations are consistent with possible pregnancy  Also plan for 0 5 of Ativan attempt to relieve patient anxiety at this time    If lab workup and EKG negative, most likely is anxiety related, plan for follow-up with PCP and discharge with return precautions  Amount and/or Complexity of Data Reviewed  Clinical lab tests: ordered and reviewed  Tests in the radiology section of CPT®: ordered and reviewed  Tests in the medicine section of CPT®: ordered and reviewed  Review and summarize past medical records: yes  Independent visualization of images, tracings, or specimens: yes    Risk of Complications, Morbidity, and/or Mortality  Presenting problems: low  Diagnostic procedures: low  Management options: low    Patient Progress  Patient progress: stable      Disposition  Final diagnoses:   Palpitations   Lightheadedness     Time reflects when diagnosis was documented in both MDM as applicable and the Disposition within this note     Time User Action Codes Description Comment    11/21/2022 12:44 PM Rita Hernandez Add [R00 2] Palpitations     11/21/2022 12:44 PM Rita Hernandez Add [R42] [address] [facility]       ED Disposition     ED Disposition   Discharge    Condition   Stable    Date/Time   Mon Nov 21, 2022  1:20 PM    Comment   Patricia Purple discharge to home/self care  Follow-up Information     Follow up With Specialties Details Why Contact Info Additional [address] Emergency Department Emergency Medicine Go to  If symptoms worsen [address] [phone]  8 [address] Emergency Department, [address], [place], [place], [address] [facility] [address], [facility] Nurse Practitioner Go to  If symptoms worsen [address] [facility] [address]  [phone]             Discharge Medication List as of 11/21/2022  2:03 PM      CONTINUE these medications which have NOT CHANGED    Details   metoclopramide (REGLAN) 5 mg tablet Take 1 tablet (5 mg total) by mouth if needed (migraine) Up to two tablets in one day   Do not take more than 3 times per week , Starting Thu 7/21/2022, Normal      Multiple Vitamins-Minerals (MULTIVITAMIN ADULTS PO) Take 1 tablet by mouth daily, Historical Med      SUMAtriptan (Imitrex) 50 mg tablet Take 1 tablet (50 mg total) by mouth once as needed for migraine, Starting Thu 7/21/2022, Normal           No discharge procedures on file  PDMP Review     None           ED Provider  Attending physically available and evaluated Tonya Jarvis  YOVANY managed the patient along with the ED Attending      Electronically Signed by         Km Sheppard MD  11/21/22 9460

## 2022-11-21 NOTE — Clinical Note
Louie Nicklk was seen and treated in our emergency department on 11/21/2022  Diagnosis:     Tao Perez  may return to work on return date  She may return on this date: 11/22/2022         If you have any questions or concerns, please don't hesitate to call        Anjana Ware MD    ______________________________           _______________          _______________  [facility] Representative                              Date                                Time

## 2022-11-30 ENCOUNTER — OFFICE VISIT (OUTPATIENT)
Dept: FAMILY MEDICINE CLINIC | Facility: CLINIC | Age: 32
End: 2022-11-30

## 2022-11-30 VITALS
HEART RATE: 119 BPM | WEIGHT: 220.2 LBS | TEMPERATURE: 97.6 F | BODY MASS INDEX: 39.32 KG/M2 | SYSTOLIC BLOOD PRESSURE: 132 MMHG | DIASTOLIC BLOOD PRESSURE: 98 MMHG | OXYGEN SATURATION: 99 %

## 2022-11-30 DIAGNOSIS — I10 PRIMARY HYPERTENSION: Primary | ICD-10-CM

## 2022-11-30 PROBLEM — F32.2 CURRENT SEVERE EPISODE OF MAJOR DEPRESSIVE DISORDER WITHOUT PSYCHOTIC FEATURES WITHOUT PRIOR EPISODE (HCC): Status: RESOLVED | Noted: 2022-04-20 | Resolved: 2022-11-30

## 2022-11-30 RX ORDER — AMLODIPINE BESYLATE 5 MG/1
5 TABLET ORAL DAILY
Qty: 90 TABLET | Refills: 3 | Status: SHIPPED | OUTPATIENT
Start: 2022-11-30

## 2022-11-30 RX ORDER — BLOOD-GLUCOSE METER
1 KIT MISCELLANEOUS DAILY
Qty: 1 EACH | Refills: 0 | Status: SHIPPED | OUTPATIENT
Start: 2022-11-30

## 2022-11-30 NOTE — PATIENT INSTRUCTIONS
Hypertension   WHAT YOU NEED TO KNOW:   What is hypertension? Hypertension is high blood pressure  Your blood pressure is the force of your blood moving against the walls of your arteries  Hypertension causes your blood pressure to get so high that your heart has to work much harder than normal  This can damage your heart  The cause of hypertension may not be known  This is called essential or primary hypertension  Hypertension caused by another medical condition, such as kidney disease, is called secondary hypertension  What do I need to know about the stages of hypertension? Normal blood pressure is 119/79 or lower   Your healthcare provider may only check your blood pressure each year if it stays at a normal level  Elevated blood pressure is 120/79 to 129/79   This is sometimes called prehypertension  Your healthcare provider may suggest lifestyle changes to help lower your blood pressure to a normal level  He or she may then check it again in 3 to 6 months  Stage 1 hypertension is 130/80  to 139/89   Your provider may recommend lifestyle changes, medication, and checks every 3 to 6 months until your blood pressure is controlled  Stage 2 hypertension is 140/90 or higher   Your provider will recommend lifestyle changes and have you take 2 kinds of hypertension medicines  You will also need to have your blood pressure checked monthly until it is controlled  What increases my risk for hypertension? Age older than 54 years (men) or 72 (women)    Stress, or a family history of hypertension or heart disease    Obesity, lack of exercise, or too many high-sodium foods    Use of tobacco, alcohol, or illegal drugs    A medical condition, such as diabetes, kidney disease, thyroid disease, or adrenal gland disorder    Certain medicines, such as steroids or birth control pills    What are the signs and symptoms of hypertension?   You may have no signs or symptoms, or you may have any of the following:  Headache    Blurred vision    Chest pain    Dizziness or weakness    Trouble breathing    Nosebleeds    How is hypertension diagnosed? Your healthcare provider will take your blood pressure at several visits  You may also need to check your blood pressure at home  The provider will examine you and ask what medicines you take  He or she will also ask if you have a family history of high blood pressure and about any health conditions you have  He or she will also check your blood pressure and weight and examine your heart, lungs, and eyes  You may need any of the following tests: An ambulatory blood pressure monitor (ABPM)  is a device that you wear  ABPM measures your blood pressure while you do your regular daily activities  It records your blood pressure every 15 to 30 minutes during the day  It also records your blood pressure every 15 minutes to 1 hour at night  The recorded blood pressures help your healthcare provider know if you have hypertension not seen at your appointment  Blood tests  may help healthcare providers find the cause of your hypertension  Blood tests can also help find other health problems caused by hypertension  Urine tests  will be done to check your kidney function  Kidney problems can increase your risk for hypertension  Which medicines are used to treat hypertension? Antihypertensives  may be used to help lower your blood pressure  Several kinds of medicines are available  Your healthcare provider will choose medicines based on the kind of hypertension you have  You may need more than one type of medicine  Take the medicine exactly as directed  Diuretics  help decrease extra fluid that collects in your body  This will help lower your blood pressure  You may urinate more often while you take this medicine  Cholesterol medicine  helps lower your cholesterol level   A low cholesterol level helps prevent heart disease and makes it easier to control your blood pressure  What can I do to manage hypertension? Check your blood pressure at home  Avoid smoking, caffeine, and exercise at least 30 minutes before checking your blood pressure  Sit and rest for 5 minutes before you take your blood pressure  Extend your arm and support it on a flat surface  Your arm should be at the same level as your heart  Follow the directions that came with your blood pressure monitor  Check your blood pressure 2 times, 1 minute apart, before you take your medicine in the morning  Also check your blood pressure before your evening meal  Keep a record of your readings and bring it to your follow-up visits  Ask your healthcare provider what your blood pressure should be  Manage any other health conditions you have  Health conditions such as diabetes can increase your risk for hypertension  Follow your healthcare provider's instructions and take all your medicines as directed  Ask about all medicines  Certain medicines can increase your blood pressure  Examples include oral birth control pills, decongestants, herbal supplements, and NSAIDs, such as ibuprofen  Your healthcare provider can tell you which medicines are safe for you to take  This includes prescription and over-the-counter medicines  What lifestyle changes can I make to manage hypertension? Your healthcare provider may recommend you work with a team to manage hypertension  The team may include medical experts such as a dietitian, an exercise or physical therapist, and a behavior therapist  Your family members may be included in helping you create lifestyle changes  Limit sodium (salt) as directed  Too much sodium can affect your fluid balance  Check labels to find low-sodium or no-salt-added foods  Some low-sodium foods use potassium salts for flavor  Too much potassium can also cause health problems  Your healthcare provider will tell you how much sodium and potassium are safe for you to have in a day   He or she may recommend that you limit sodium to 2,300 mg a day  Follow the meal plan recommended by your healthcare provider  A dietitian or your provider can give you more information on low-sodium plans or the DASH (Dietary Approaches to Stop Hypertension) eating plan  The DASH plan is low in sodium, processed sugar, unhealthy fats, and total fat  It is high in potassium, calcium, and fiber  These can be found in vegetables, fruit, and whole-grain foods  Be physically active throughout the day  Physical activity, such as exercise, can help control your blood pressure and your weight  Be physically active for at least 30 minutes per day, on most days of the week  Include aerobic activity, such as walking or riding a bicycle  Also include strength training at least 2 times each week  Your healthcare providers can help you create a physical activity plan  Decrease stress  This may help lower your blood pressure  Learn ways to relax, such as deep breathing or listening to music  Limit alcohol as directed  Alcohol can increase your blood pressure  A drink of alcohol is 12 ounces of beer, 5 ounces of wine, or 1½ ounces of liquor  Do not smoke  Nicotine and other chemicals in cigarettes and cigars can increase your blood pressure and also cause lung damage  Ask your healthcare provider for information if you currently smoke and need help to quit  E-cigarettes or smokeless tobacco still contain nicotine  Talk to your healthcare provider before you use these products  Call your local emergency number [phone] in the [address] [facility] [facility] or have someone call if:   You have chest pain      You have any of the following signs of a heart attack:      Squeezing, pressure, or pain in your chest    You may  also have any of the following:     Discomfort or pain in your back, neck, jaw, stomach, or arm    Shortness of breath    Nausea or vomiting    Lightheadedness or a sudden cold sweat    You become confused or have trouble speaking  You suddenly feel lightheaded or have trouble breathing  When should I seek immediate care? You have a severe headache or vision loss  You have weakness in an arm or leg  When should I call my doctor? You feel faint, dizzy, confused, or drowsy  You have been taking your blood pressure medicine but your pressure is higher than your provider says it should be  You have questions or concerns about your condition or care  CARE AGREEMENT:   You have the right to help plan your care  Learn about your health condition and how it may be treated  Discuss treatment options with your healthcare providers to decide what care you want to receive  You always have the right to refuse treatment  The above information is an  only  It is not intended as medical advice for individual conditions or treatments  Talk to your doctor, nurse or pharmacist before following any medical regimen to see if it is safe and effective for you  © Copyright [employer] 2022 Information is for End User's use only and may not be sold, redistributed or otherwise used for commercial purposes   All illustrations and images included in CareNotes® are the copyrighted property of A D A M , Inc  or [address] [place] [address]

## 2022-11-30 NOTE — PROGRESS NOTES
Subjective:   Chief Complaint   Patient presents with   • Follow-up     High blood pressure  Started with a cough today  Patient ID: Andres Sainz is a 28 y o  female  Presents today for ED follow-up on November 21st for palpitations  Was at work and was experiencing heart palpitations and dizziness and was found to have an extremely high blood pressure  She was taken to the emergency room for her palpitations and high blood pressure  Blood pressure in the emergency room was 164/101  Blood work was normal as was her EKG  She was advised to follow up with PCP within 1 week  She does have a family history both her mother and father have hypertension with her father being diagnosed before the age of 39  Blood pressure on admission to the office today was 144/100 repeat blood pressure was 132/98  Discussed with her starting on amlodipine 5 mg daily at bedtime  She should check her blood pressure daily different times during the day and blood pressure machine was ordered      The following portions of the patient's history were reviewed and updated as appropriate: allergies, current medications, past family history, past medical history, past social history, past surgical history and problem list     Review of Systems   Constitutional: Negative for chills, fatigue and fever  Respiratory: Positive for cough  Negative for shortness of breath  Cardiovascular: Negative for palpitations  Psychiatric/Behavioral: Negative for dysphoric mood  The patient is not nervous/anxious  Objective:  Vitals:    11/30/22 1546 11/30/22 1631   BP: 144/100 132/98   BP Location: Left arm Left arm   Patient Position: Sitting Sitting   Cuff Size: Large Adult   Pulse: (!) 119    Temp: 97 6 °F (36 4 °C)    TempSrc: Temporal    SpO2: 99%    Weight: 99 9 kg (220 lb 3 2 oz)       Physical Exam  Vitals reviewed  Constitutional:       Appearance: Normal appearance  She is obese     Cardiovascular: Rate and Rhythm: Normal rate and regular rhythm  Pulses: Normal pulses  Heart sounds: Normal heart sounds  Pulmonary:      Effort: Pulmonary effort is normal       Breath sounds: Normal breath sounds  Skin:     General: Skin is warm and dry  Capillary Refill: Capillary refill takes less than 2 seconds  Neurological:      Mental Status: She is alert and oriented to person, place, and time  Psychiatric:         Mood and Affect: Mood normal          Behavior: Behavior normal            Assessment/Plan:    No problem-specific Assessment & Plan notes found for this encounter  Diagnoses and all orders for this visit:    Primary hypertension  Comments:  Low-salt diet information provided  Orders:  -     amLODIPine (NORVASC) 5 mg tablet;  Take 1 tablet (5 mg total) by mouth daily  -     Blood Pressure Monitoring (Blood Pressure Mon/Auto/Wrist) WILLY; Use 1 Units/day in the morning

## 2022-12-12 ENCOUNTER — CLINICAL SUPPORT (OUTPATIENT)
Dept: FAMILY MEDICINE CLINIC | Facility: CLINIC | Age: 32
End: 2022-12-12

## 2022-12-12 VITALS — DIASTOLIC BLOOD PRESSURE: 90 MMHG | SYSTOLIC BLOOD PRESSURE: 130 MMHG

## 2022-12-12 DIAGNOSIS — R03.0 ELEVATED BLOOD-PRESSURE READING WITHOUT DIAGNOSIS OF HYPERTENSION: Primary | ICD-10-CM

## 2023-01-10 ENCOUNTER — RA CDI HCC (OUTPATIENT)
Dept: OTHER | Facility: HOSPITAL | Age: 33
End: 2023-01-10

## 2023-01-10 NOTE — PROGRESS NOTES
[facility] 75  coding opportunities          Chart Reviewed number of suggestions sent to Provider: 2   [id number]  [id number]    Patients Insurance        Commercial Insurance: [employer]

## 2023-05-04 ENCOUNTER — OFFICE VISIT (OUTPATIENT)
Dept: FAMILY MEDICINE CLINIC | Facility: CLINIC | Age: 33
End: 2023-05-04

## 2023-05-04 VITALS
SYSTOLIC BLOOD PRESSURE: 154 MMHG | OXYGEN SATURATION: 98 % | TEMPERATURE: 98.7 F | DIASTOLIC BLOOD PRESSURE: 92 MMHG | BODY MASS INDEX: 38.71 KG/M2 | HEART RATE: 97 BPM | WEIGHT: 216.8 LBS

## 2023-05-04 DIAGNOSIS — I10 PRIMARY HYPERTENSION: ICD-10-CM

## 2023-05-04 DIAGNOSIS — F41.1 GENERALIZED ANXIETY DISORDER: Primary | ICD-10-CM

## 2023-05-04 RX ORDER — SERTRALINE HYDROCHLORIDE 25 MG/1
25 TABLET, FILM COATED ORAL DAILY
Qty: 30 TABLET | Refills: 1 | Status: SHIPPED | OUTPATIENT
Start: 2023-05-04

## 2023-05-04 RX ORDER — HYDROCHLOROTHIAZIDE 12.5 MG/1
12.5 TABLET ORAL DAILY
Qty: 30 TABLET | Refills: 1 | Status: SHIPPED | OUTPATIENT
Start: 2023-05-04 | End: 2023-10-31

## 2023-05-04 NOTE — PROGRESS NOTES
Subjective:   Chief Complaint   Patient presents with    Chest Pain     Uncomfortable, tight feeling for a few days on and off        Patient ID: Jovanni Singh is a 28 y o  female  Presents today for complaints of chest tightness off and on over the last several days  Yesterday she experienced chest tightness, sweating, hot flash, and shortness of breath while at work  Nurses took her blood pressure which was all normal   KRYS-7 today score was a 10 placing her in the moderate anxiety category and PHQ-9 was 0  Discussed referral to counseling to help with coping mechanisms to deal with the anxiety and patient is agreeable  Blood pressure today is elevated at 128/90 and she continues to take amlodipine 5 mg at bedtime  Repeat blood pressure was 154/92  Discussed adding hydrochlorothiazide 12 5 mg daily  Blood pressures at home have been running in the high 309S to 608I systolic and diastolic 94E and low 92J  The following portions of the patient's history were reviewed and updated as appropriate: allergies, current medications, past family history, past medical history, past social history, past surgical history and problem list     Review of Systems   Constitutional: Negative for chills, fatigue and fever  Respiratory: Positive for chest tightness  Negative for cough and shortness of breath  Cardiovascular: Positive for palpitations ( When chest tightness occurs)  Negative for chest pain and leg swelling  Psychiatric/Behavioral: Negative for dysphoric mood  The patient is nervous/anxious  Objective:  Vitals:    05/04/23 1129 05/04/23 1157   BP: 128/90 154/92   BP Location: Left arm Left arm   Patient Position: Sitting Sitting   Cuff Size: Large    Pulse: 97    Temp: 98 7 °F (37 1 °C)    TempSrc: Temporal    SpO2: 98%    Weight: 98 3 kg (216 lb 12 8 oz)       Physical Exam  Vitals reviewed  Constitutional:       Appearance: Normal appearance  She is well-developed   She is obese    Cardiovascular:      Rate and Rhythm: Normal rate and regular rhythm  Pulses: Normal pulses  Heart sounds: Normal heart sounds  Pulmonary:      Effort: Pulmonary effort is normal       Breath sounds: Normal breath sounds  Musculoskeletal:      Cervical back: Normal range of motion and neck supple  Lymphadenopathy:      Cervical: No cervical adenopathy  Skin:     General: Skin is warm and dry  Capillary Refill: Capillary refill takes less than 2 seconds  Neurological:      Mental Status: She is alert and oriented to person, place, and time  Psychiatric:         Mood and Affect: Mood normal          Behavior: Behavior normal            Assessment/Plan:    No problem-specific Assessment & Plan notes found for this encounter  Diagnoses and all orders for this visit:    Generalized anxiety disorder  -     sertraline (Zoloft) 25 mg tablet; Take 1 tablet (25 mg total) by mouth daily    Primary hypertension  -     hydrochlorothiazide (HYDRODIURIL) 12 5 mg tablet; Take 1 tablet (12 5 mg total) by mouth daily  -     Comprehensive metabolic panel;  Future

## 2023-06-05 ENCOUNTER — OFFICE VISIT (OUTPATIENT)
Dept: FAMILY MEDICINE CLINIC | Facility: CLINIC | Age: 33
End: 2023-06-05
Payer: COMMERCIAL

## 2023-06-05 VITALS
DIASTOLIC BLOOD PRESSURE: 68 MMHG | WEIGHT: 219.4 LBS | OXYGEN SATURATION: 98 % | BODY MASS INDEX: 39.18 KG/M2 | HEART RATE: 106 BPM | TEMPERATURE: 98 F | SYSTOLIC BLOOD PRESSURE: 110 MMHG

## 2023-06-05 DIAGNOSIS — F41.1 GENERALIZED ANXIETY DISORDER: ICD-10-CM

## 2023-06-05 PROCEDURE — 99214 OFFICE O/P EST MOD 30 MIN: CPT | Performed by: NURSE PRACTITIONER

## 2023-06-05 RX ORDER — SERTRALINE HYDROCHLORIDE 25 MG/1
25 TABLET, FILM COATED ORAL DAILY
Qty: 30 TABLET | Refills: 2 | Status: SHIPPED | OUTPATIENT
Start: 2023-06-05

## 2023-06-05 NOTE — PROGRESS NOTES
Subjective:   Chief Complaint   Patient presents with   • Anxiety   • Depression        Patient ID: Alexx Miguel is a 28 y o  female  Presents today for follow-up on generalized anxiety disorder  She has now been on the sertraline for 4 weeks and doing well on this dose  States she is not having any more palpitations, shortness of breath, or irritability  KRYS-7 today is a 0 down from a 10 4 weeks ago  PHQ-9 is 0 and was at 04 weeks ago  Discussed continuing on current dose  She also has not made a therapy appointment, secondary to her father having another heart attack and she is helping her mother take care of him but she will get an appointment made  All questions were asked and answered    More than half of this 15 minute visit spent counseling and coordinating care  The following portions of the patient's history were reviewed and updated as appropriate: allergies, current medications, past family history, past medical history, past social history, past surgical history and problem list     Review of Systems          Objective:  Vitals:    06/05/23 1610   BP: 110/68   BP Location: Left arm   Patient Position: Sitting   Cuff Size: Large   Pulse: (!) 106   Temp: 98 °F (36 7 °C)   SpO2: 98%   Weight: 99 5 kg (219 lb 6 4 oz)      Physical Exam      Assessment/Plan:    No problem-specific Assessment & Plan notes found for this encounter  Diagnoses and all orders for this visit:    Generalized anxiety disorder  -     sertraline (Zoloft) 25 mg tablet;  Take 1 tablet (25 mg total) by mouth daily

## 2023-06-21 DIAGNOSIS — I10 PRIMARY HYPERTENSION: ICD-10-CM

## 2023-06-21 RX ORDER — HYDROCHLOROTHIAZIDE 12.5 MG/1
TABLET ORAL
Qty: 30 TABLET | Refills: 0 | Status: SHIPPED | OUTPATIENT
Start: 2023-06-21

## 2023-07-13 ENCOUNTER — OFFICE VISIT (OUTPATIENT)
Dept: FAMILY MEDICINE CLINIC | Facility: CLINIC | Age: 33
End: 2023-07-13
Payer: COMMERCIAL

## 2023-07-13 VITALS
TEMPERATURE: 97.2 F | SYSTOLIC BLOOD PRESSURE: 112 MMHG | DIASTOLIC BLOOD PRESSURE: 78 MMHG | OXYGEN SATURATION: 97 % | BODY MASS INDEX: 39.9 KG/M2 | HEIGHT: 62 IN | HEART RATE: 88 BPM | WEIGHT: 216.8 LBS

## 2023-07-13 DIAGNOSIS — I10 PRIMARY HYPERTENSION: ICD-10-CM

## 2023-07-13 DIAGNOSIS — E55.9 VITAMIN D DEFICIENCY: ICD-10-CM

## 2023-07-13 DIAGNOSIS — F41.1 GENERALIZED ANXIETY DISORDER: ICD-10-CM

## 2023-07-13 DIAGNOSIS — Z00.00 ANNUAL PHYSICAL EXAM: Primary | ICD-10-CM

## 2023-07-13 PROCEDURE — 99395 PREV VISIT EST AGE 18-39: CPT | Performed by: NURSE PRACTITIONER

## 2023-07-13 PROCEDURE — 99214 OFFICE O/P EST MOD 30 MIN: CPT | Performed by: NURSE PRACTITIONER

## 2023-07-13 NOTE — PROGRESS NOTES
Subjective     Dylan Wallace is a 28 y.o.  female and is here for routine health maintenance. The patient reports no problems. History of Present Illness     Presents today for annual physical and follow-up on chronic conditions. She is currently taking amlodipine 5 mg and hydrochlorothiazide 12.5 mg daily. Blood pressure today is well controlled at 112/78 and her home blood pressures run systolic 461'S diastolic 75/54'C. She is currently on sertraline 25 mg for anxiety and depression. PHQ-9 today score was a 0 and KRYS-7 score was also 0. A 3 pound weight loss has been noted since  and she was encouraged to continue with both diet and exercise. Well Adult Physical   Patient here for a comprehensive physical exam.      Diet and Physical Activity  Diet: poor diet  Weight concerns: Patient has class 2 obesity (BMI 35.0-39. 9)  Exercise: infrequently      Depression Screen  PHQ-2/9 Depression Screening    Little interest or pleasure in doing things: 0 - not at all  Feeling down, depressed, or hopeless: 0 - not at all  PHQ-2 Score: 0  PHQ-2 Interpretation: Negative depression screen          General Health  Hearing: Normal:  bilateral  Vision: no vision problems, goes for regular eye exams, most recent eye exam >1 year and wears glasses  Dental: regular dental visits, brushes teeth three times daily and flosses teeth occasionally     History:  LMP: 10/23/2022    : 1  Para: 0    Cancer Screening  Colononoscopy N/A  Mammogram N/A   Pap 2020  Abnormal pap? no  Smoker Never   Annual screening with low-dose helical computed tomography (CT) for patients age 54 to 76 years with history of smoking at least 30 pack-years and, if a former smoker, had quit within the previous 15 years        The following portions of the patient's history were reviewed and updated as appropriate: allergies, current medications, past family history, past medical history, past social history, past surgical history and problem list.    Review of Systems     Review of Systems   Constitutional: Negative for chills, fatigue and fever. HENT: Negative for congestion, ear pain, postnasal drip, rhinorrhea, sinus pressure and sore throat. Eyes: Negative for pain and visual disturbance. Respiratory: Negative for cough, shortness of breath and wheezing. Cardiovascular: Negative for chest pain, palpitations and leg swelling. Gastrointestinal: Negative for abdominal pain, blood in stool, constipation, diarrhea, nausea and vomiting. Endocrine: Negative for cold intolerance, heat intolerance, polydipsia, polyphagia and polyuria. Genitourinary: Positive for menstrual problem (amenorrhia). Negative for dysuria, frequency, hematuria, pelvic pain, urgency and vaginal discharge. Musculoskeletal: Negative for arthralgias, back pain and gait problem. Skin: Negative for rash. Allergic/Immunologic: Negative for environmental allergies and food allergies. Neurological: Negative for dizziness and headaches. Hematological: Does not bruise/bleed easily. Psychiatric/Behavioral: Negative for dysphoric mood (being treated). The patient is not nervous/anxious (being treated). Past Medical History     Past Medical History:   Diagnosis Date   • Allergic    • Anxiety    • Asthma    • Depression    • Headache(784.0)    • Migraine    • UTI (urinary tract infection)        Past Surgical History     History reviewed. No pertinent surgical history.     Social History     Social History     Socioeconomic History   • Marital status: Single     Spouse name: None   • Number of children: None   • Years of education: None   • Highest education level: None   Occupational History   • None   Tobacco Use   • Smoking status: Never   • Smokeless tobacco: Never   Vaping Use   • Vaping Use: Never used   Substance and Sexual Activity   • Alcohol use: Yes     Comment: Social   • Drug use: No   • Sexual activity: Yes     Partners: Male     Birth control/protection: None   Other Topics Concern   • None   Social History Narrative   • None     Social Determinants of Health     Financial Resource Strain: Not on file   Food Insecurity: Not on file   Transportation Needs: Not on file   Physical Activity: Not on file   Stress: Not on file   Social Connections: Not on file   Intimate Partner Violence: Not on file   Housing Stability: Not on file       Family History     Family History   Problem Relation Age of Onset   • Hypertension Mother    • Asthma Mother    • Arthritis Mother    • Hypertension Father    • Coronary artery disease Father    • Hyperlipidemia Father    • Diabetes Father    • COPD Father    • Hypertension Paternal Aunt    • Hypertension Paternal Uncle    • Breast cancer Cousin    • Colon cancer Cousin    • Ovarian cancer Cousin        Current Medications       Current Outpatient Medications:   •  amLODIPine (NORVASC) 5 mg tablet, Take 1 tablet (5 mg total) by mouth daily, Disp: 90 tablet, Rfl: 3  •  Blood Pressure Monitoring (Blood Pressure Mon/Auto/Wrist) WILLY, Use 1 Units/day in the morning, Disp: 1 each, Rfl: 0  •  hydrochlorothiazide (HYDRODIURIL) 12.5 mg tablet, Take 1 tablet by mouth once daily, Disp: 30 tablet, Rfl: 0  •  Multiple Vitamins-Minerals (MULTIVITAMIN ADULTS PO), Take 1 tablet by mouth daily, Disp: , Rfl:   •  sertraline (Zoloft) 25 mg tablet, Take 1 tablet (25 mg total) by mouth daily, Disp: 30 tablet, Rfl: 2  •  metoclopramide (REGLAN) 5 mg tablet, Take 1 tablet (5 mg total) by mouth if needed (migraine) Up to two tablets in one day. Do not take more than 3 times per week.  (Patient not taking: Reported on 11/30/2022), Disp: 15 tablet, Rfl: 2  •  SUMAtriptan (Imitrex) 50 mg tablet, Take 1 tablet (50 mg total) by mouth once as needed for migraine (Patient not taking: Reported on 11/30/2022), Disp: 9 tablet, Rfl: 3     Allergies     No Known Allergies    Objective     /78 (BP Location: Right arm, Patient Position: Sitting) Pulse 88   Temp (!) 97.2 °F (36.2 °C) (Temporal)   Ht 5' 2" (1.575 m)   Wt 98.3 kg (216 lb 12.8 oz)   SpO2 97%   BMI 39.65 kg/m²   Wt Readings from Last 3 Encounters:   07/13/23 98.3 kg (216 lb 12.8 oz)   06/05/23 99.5 kg (219 lb 6.4 oz)   05/04/23 98.3 kg (216 lb 12.8 oz)     BP Readings from Last 3 Encounters:   07/13/23 112/78   06/05/23 110/68   05/04/23 154/92     Pulse Readings from Last 3 Encounters:   07/13/23 88   06/05/23 (!) 106   05/04/23 97     Body mass index is 39.65 kg/m². Physical Exam  Constitutional:       Appearance: Normal appearance. She is well-developed. HENT:      Head: Normocephalic. Right Ear: Tympanic membrane, ear canal and external ear normal.      Left Ear: Tympanic membrane, ear canal and external ear normal.      Nose: Nose normal.      Mouth/Throat:      Mouth: Mucous membranes are moist.      Pharynx: Oropharynx is clear. Eyes:      Extraocular Movements: Extraocular movements intact. Conjunctiva/sclera: Conjunctivae normal.      Pupils: Pupils are equal, round, and reactive to light. Neck:      Thyroid: No thyromegaly. Cardiovascular:      Rate and Rhythm: Normal rate and regular rhythm. Heart sounds: Normal heart sounds. Pulmonary:      Effort: Pulmonary effort is normal.      Breath sounds: Normal breath sounds. Abdominal:      General: Bowel sounds are normal. There is no distension. Palpations: Abdomen is soft. There is no mass. Tenderness: There is no abdominal tenderness. There is no right CVA tenderness, left CVA tenderness, guarding or rebound. Hernia: No hernia is present. Musculoskeletal:         General: Normal range of motion. Cervical back: Normal range of motion and neck supple. Lymphadenopathy:      Cervical: No cervical adenopathy. Skin:     General: Skin is warm and dry. Capillary Refill: Capillary refill takes less than 2 seconds.    Neurological:      Mental Status: She is alert and oriented to person, place, and time. Deep Tendon Reflexes: Reflexes are normal and symmetric. Psychiatric:         Mood and Affect: Mood normal.         Behavior: Behavior normal.           No results found.     Health Maintenance     Health Maintenance   Topic Date Due   • COVID-19 Vaccine (1) Never done   • Pneumococcal Vaccine: Pediatrics (0 to 5 Years) and At-Risk Patients (6 to 59 Years) (1 - PCV) Never done   • Cervical Cancer Screening  01/21/2023   • Influenza Vaccine (1) 09/01/2023   • Depression Screening  07/13/2024   • BMI: Followup Plan  07/13/2024   • BMI: Adult  07/13/2024   • Annual Physical  07/13/2024   • DTaP,Tdap,and Td Vaccines (8 - Td or Tdap) 10/27/2030   • HIV Screening  Completed   • Hepatitis C Screening  Completed   • HIB Vaccine  Completed   • Meningococcal ACWY Vaccine  Completed   • HPV Vaccine  Completed   • IPV Vaccine  Aged Out   • Hepatitis A Vaccine  Aged Out     Immunization History   Administered Date(s) Administered   • DTaP 1990, 01/02/1991, 03/13/1991, 04/01/1992, 02/08/1995   • HPV Quadrivalent 08/09/2007, 10/08/2007, 02/19/2008   • Hep B, Adolescent or Pediatric 08/04/1994, 10/12/1994, 02/08/1995   • HiB 01/02/1991, 03/13/1991, 06/05/1991, 04/01/1992   • INFLUENZA 11/09/2011, 11/03/2018, 09/01/2020   • Meningococcal MCV4P 01/31/2008   • Tdap 08/11/2006, 10/27/2020   • Tuberculin Skin Test-PPD Intradermal 10/27/2003   • Varicella 02/11/1998, 01/31/2008         Laboratory Results:   Lab Results   Component Value Date    WBC 9.59 11/21/2022    WBC 7.34 11/14/2020    WBC 7.34 10/27/2020    HGB 14.1 11/21/2022    HGB 14.5 11/14/2020    HGB 15.8 (H) 10/27/2020    HCT 42.2 11/21/2022    HCT 45.0 11/14/2020    HCT 46.8 (H) 10/27/2020    MCV 87 11/21/2022    MCV 92 11/14/2020    MCV 91 10/27/2020     11/21/2022     11/14/2020     10/27/2020     Lab Results   Component Value Date    BUN 15 11/21/2022    BUN 13 05/20/2021    BUN 13 10/27/2020     Lab Results Component Value Date    GLUC 124 11/21/2022    GLUC 80 05/20/2021    GLUC 66 10/27/2020    ALT 29 11/21/2022    ALT 22 10/27/2020    ALT 17 07/19/2019    AST 33 11/21/2022    AST 14 10/27/2020    AST 12 07/19/2019     No results found for: "TSH"  No results found for: "A1C"    Lipid Profile:   No results found for: "CHOL"  Lab Results   Component Value Date    HDL 62 10/27/2020    HDL 46 07/19/2019     No results found for: "LDLC"  Lab Results   Component Value Date    LDLCALC 92 10/27/2020    LDLCALC 75 07/19/2019     Lab Results   Component Value Date    TRIG 89 10/27/2020    TRIG 117 07/19/2019                 Assessment/Plan     1. Annual physical exam  -     CBC and differential; Future  -     Comprehensive metabolic panel; Future  -     Lipid panel; Future    2. Primary hypertension  -     CBC and differential; Future  -     Comprehensive metabolic panel; Future    3. Generalized anxiety disorder    4. BMI 39.0-39.9,adult  Comments:  Discussed diet and exercise  Orders:  -     Hemoglobin A1C; Future  -     TSH, 3rd generation with Free T4 reflex; Future    5. Vitamin D deficiency  -     Vitamin D 25 hydroxy; Future          1. Healthy female exam.  2. Patient Counseling:   · Nutrition: Stressed importance of a well balanced diet, moderation of sodium/saturated fat, caloric balance and sufficient intake of fiber  · Exercise: Stressed the importance of regular exercise with a goal of 150 minutes per week  · Dental Health: Discussed daily flossing and brushing and regular dental visits   · Sexuality: Discussed sexually transmitted infections, use of condoms and prevention of unintended pregnancy  · Alcohol Use:  Recommended moderation of alcohol intake  · Injury Prevention: Discussed Safety Belts, Safety Helmets, and Smoke Detectors    · Immunizations reviewed. Yes  · Discussed benefits of screening Yes. · Discussed the patient's BMI with her. The BMI is above average; BMI management plan is completed  3. Cancer Screening Needs PAP  4. Labs ordered  5. Follow up next physical in 1 year. Marylee Humble, CRNP      BMI Counseling: Body mass index is 39.65 kg/m². The BMI is above normal. Nutrition recommendations include reducing portion sizes, decreasing overall calorie intake, 3-5 servings of fruits/vegetables daily, reducing fast food intake, consuming healthier snacks, decreasing soda and/or juice intake, moderation in carbohydrate intake and increasing intake of lean protein. Exercise recommendations include exercising 3-5 times per week.

## 2023-07-17 DIAGNOSIS — I10 PRIMARY HYPERTENSION: ICD-10-CM

## 2023-07-18 RX ORDER — HYDROCHLOROTHIAZIDE 12.5 MG/1
TABLET ORAL
Qty: 30 TABLET | Refills: 0 | Status: SHIPPED | OUTPATIENT
Start: 2023-07-18 | End: 2023-07-24

## 2023-07-22 DIAGNOSIS — I10 PRIMARY HYPERTENSION: ICD-10-CM

## 2023-07-24 RX ORDER — HYDROCHLOROTHIAZIDE 12.5 MG/1
TABLET ORAL
Qty: 30 TABLET | Refills: 0 | Status: SHIPPED | OUTPATIENT
Start: 2023-07-24

## 2023-08-15 LAB — HBA1C MFR BLD HPLC: 5.4 %

## 2023-09-07 DIAGNOSIS — F41.1 GENERALIZED ANXIETY DISORDER: ICD-10-CM

## 2023-09-08 RX ORDER — SERTRALINE HYDROCHLORIDE 25 MG/1
25 TABLET, FILM COATED ORAL DAILY
Qty: 30 TABLET | Refills: 0 | Status: SHIPPED | OUTPATIENT
Start: 2023-09-08

## 2023-09-16 DIAGNOSIS — I10 PRIMARY HYPERTENSION: ICD-10-CM

## 2023-09-18 RX ORDER — HYDROCHLOROTHIAZIDE 12.5 MG/1
TABLET ORAL
Qty: 30 TABLET | Refills: 0 | Status: SHIPPED | OUTPATIENT
Start: 2023-09-18

## 2023-11-10 DIAGNOSIS — I10 PRIMARY HYPERTENSION: ICD-10-CM

## 2023-11-14 RX ORDER — AMLODIPINE BESYLATE 5 MG/1
5 TABLET ORAL DAILY
Qty: 90 TABLET | Refills: 0 | Status: SHIPPED | OUTPATIENT
Start: 2023-11-14

## 2023-12-01 ENCOUNTER — OFFICE VISIT (OUTPATIENT)
Dept: FAMILY MEDICINE CLINIC | Facility: CLINIC | Age: 33
End: 2023-12-01

## 2023-12-01 VITALS
WEIGHT: 221.2 LBS | SYSTOLIC BLOOD PRESSURE: 130 MMHG | BODY MASS INDEX: 40.7 KG/M2 | HEIGHT: 62 IN | TEMPERATURE: 97.8 F | DIASTOLIC BLOOD PRESSURE: 82 MMHG | OXYGEN SATURATION: 100 % | HEART RATE: 92 BPM

## 2023-12-01 DIAGNOSIS — I10 PRIMARY HYPERTENSION: ICD-10-CM

## 2023-12-01 DIAGNOSIS — J45.909 MILD ASTHMA WITHOUT COMPLICATION, UNSPECIFIED WHETHER PERSISTENT: ICD-10-CM

## 2023-12-01 DIAGNOSIS — R05.2 SUBACUTE COUGH: Primary | ICD-10-CM

## 2023-12-01 RX ORDER — HYDROCHLOROTHIAZIDE 12.5 MG/1
12.5 TABLET ORAL DAILY
Qty: 90 TABLET | Refills: 0 | Status: SHIPPED | OUTPATIENT
Start: 2023-12-01

## 2023-12-01 RX ORDER — BENZONATATE 100 MG/1
100 CAPSULE ORAL 3 TIMES DAILY PRN
Qty: 20 CAPSULE | Refills: 0 | Status: SHIPPED | OUTPATIENT
Start: 2023-12-01

## 2023-12-01 RX ORDER — FLUTICASONE PROPIONATE 50 MCG
1 SPRAY, SUSPENSION (ML) NASAL DAILY
Qty: 18.2 ML | Refills: 0 | Status: SHIPPED | OUTPATIENT
Start: 2023-12-01

## 2023-12-01 RX ORDER — PREDNISONE 20 MG/1
40 TABLET ORAL DAILY
Qty: 10 TABLET | Refills: 0 | Status: SHIPPED | OUTPATIENT
Start: 2023-12-01 | End: 2023-12-06

## 2023-12-01 NOTE — ASSESSMENT & PLAN NOTE
Cough that has been reoccurring for months   Non-productive cough that occurs any time of the day  Has noticed an increase of post nasal drip   Chest xray ordered   Prednisone, fluticasone and benzonatate ordered

## 2023-12-01 NOTE — PROGRESS NOTES
Name: Florentino Butts      : 1990      MRN: [id number]  Encounter Provider: AUSTIN Llamas  Encounter Date: 2023   Encounter department: [facility]     1. Subacute cough  Assessment & Plan:  Cough that has been reoccurring for months   Non-productive cough that occurs any time of the day  Has noticed an increase of post nasal drip   Chest xray ordered   Prednisone, fluticasone and benzonatate ordered     Orders:  -     XR chest pa & lateral; Future; Expected date: 2023  -     fluticasone (FLONASE) 50 mcg/act nasal spray; 1 spray into each nostril daily  -     benzonatate (TESSALON PERLES) 100 mg capsule; Take 1 capsule (100 mg total) by mouth 3 (three) times a day as needed for cough    2. Mild asthma without complication, unspecified whether persistent  Assessment & Plan:  Albuterol as needed     Orders:  -     predniSONE 20 mg tablet; Take 2 tablets (40 mg total) by mouth daily for 5 days    3. Primary hypertension  Assessment & Plan: Today's /82   Current medication: Hydrochlorothiazide 12.5mg, Amlodipine 5mg    Advised patient on symptoms of hypotension & severe HTN  Limit salt-intake & caffeine. DASH diet discussed, minimize stress level  Weight reduction efforts via improved diet & increased exercise    Orders:  -     hydrochlorothiazide (HYDRODIURIL) 12.5 mg tablet; Take 1 tablet (12.5 mg total) by mouth daily           Subjective      Cough  This is a recurrent problem. The current episode started more than 1 month ago. The problem has been unchanged. The problem occurs every few hours. The cough is Non-productive. Associated symptoms include nasal congestion, postnasal drip and shortness of breath. Pertinent negatives include no chest pain, chills, ear congestion, ear pain, fever, headaches, heartburn, hemoptysis, myalgias, rash, rhinorrhea, sore throat, sweats, weight loss or wheezing.  Exacerbated by: walking around at work, also just sitting. She has tried a beta-agonist inhaler for the symptoms. The treatment provided no relief. Her past medical history is significant for asthma. Review of Systems   Constitutional:  Negative for chills, fever and weight loss. HENT:  Positive for postnasal drip. Negative for ear pain, rhinorrhea and sore throat. Respiratory:  Positive for cough and shortness of breath. Negative for hemoptysis and wheezing. Cardiovascular:  Negative for chest pain. Gastrointestinal:  Negative for heartburn. Musculoskeletal:  Negative for myalgias. Skin:  Negative for rash. Neurological:  Negative for headaches. Current Outpatient Medications on File Prior to Visit   Medication Sig    amLODIPine (NORVASC) 5 mg tablet Take 1 tablet by mouth once daily    Blood Pressure Monitoring (Blood Pressure Mon/Auto/Wrist) WILLY Use 1 Units/day in the morning    Multiple Vitamins-Minerals (MULTIVITAMIN ADULTS PO) Take 1 tablet by mouth daily    sertraline (ZOLOFT) 25 mg tablet Take 1 tablet by mouth once daily    [DISCONTINUED] hydrochlorothiazide (HYDRODIURIL) 12.5 mg tablet Take 1 tablet by mouth once daily    [DISCONTINUED] metoclopramide (REGLAN) 5 mg tablet Take 1 tablet (5 mg total) by mouth if needed (migraine) Up to two tablets in one day. Do not take more than 3 times per week. (Patient not taking: Reported on 11/30/2022)    [DISCONTINUED] SUMAtriptan (Imitrex) 50 mg tablet Take 1 tablet (50 mg total) by mouth once as needed for migraine (Patient not taking: Reported on 11/30/2022)       Objective     /82 (BP Location: Left arm, Patient Position: Sitting, Cuff Size: Large)   Pulse 92   Temp 97.8 °F (36.6 °C) (Temporal)   Ht 5' 2" (1.575 m)   Wt 100 kg (221 lb 3.2 oz)   SpO2 100%   BMI 40.46 kg/m²     Physical Exam  Vitals and nursing note reviewed. Constitutional:       Appearance: Normal appearance. She is normal weight. HENT:      Head: Normocephalic.       Right Ear: Tympanic membrane, ear canal and external ear normal. There is no impacted cerumen. Left Ear: Tympanic membrane, ear canal and external ear normal. There is no impacted cerumen. Nose: Nose normal.      Mouth/Throat:      Mouth: Mucous membranes are moist.      Pharynx: Oropharynx is clear. Eyes:      Extraocular Movements: Extraocular movements intact. Conjunctiva/sclera: Conjunctivae normal.      Pupils: Pupils are equal, round, and reactive to light. Cardiovascular:      Rate and Rhythm: Normal rate and regular rhythm. Pulses: Normal pulses. Heart sounds: Normal heart sounds. Pulmonary:      Effort: Pulmonary effort is normal.      Breath sounds: Normal breath sounds. Abdominal:      General: Bowel sounds are normal. There is no distension. Palpations: Abdomen is soft. Tenderness: There is no abdominal tenderness. Musculoskeletal:         General: Normal range of motion. Cervical back: Normal range of motion. Skin:     General: Skin is warm. Capillary Refill: Capillary refill takes less than 2 seconds. Neurological:      General: No focal deficit present. Mental Status: She is alert and oriented to person, place, and time. Mental status is at baseline. Psychiatric:         Mood and Affect: Mood normal.         Behavior: Behavior normal.         Thought Content:  Thought content normal.         Judgment: Judgment normal.       Verner Law, CRNP

## 2023-12-01 NOTE — ASSESSMENT & PLAN NOTE
Today's initial /108 repeat 130/82  Current medication: Hydrochlorothiazide 12.5mg, Amlodipine 5mg    Advised patient on symptoms of hypotension & severe HTN  Limit salt-intake & caffeine.  DASH diet discussed, minimize stress level  Weight reduction efforts via improved diet & increased exercise

## 2023-12-07 ENCOUNTER — HOSPITAL ENCOUNTER (OUTPATIENT)
Dept: RADIOLOGY | Facility: IMAGING CENTER | Age: 33
End: 2023-12-07
Payer: COMMERCIAL

## 2023-12-07 DIAGNOSIS — R05.2 SUBACUTE COUGH: ICD-10-CM

## 2023-12-07 PROCEDURE — 71046 X-RAY EXAM CHEST 2 VIEWS: CPT

## 2024-01-30 PROBLEM — R05.2 SUBACUTE COUGH: Status: RESOLVED | Noted: 2023-12-01 | Resolved: 2024-01-30

## 2024-03-19 ENCOUNTER — OFFICE VISIT (OUTPATIENT)
Dept: OBGYN CLINIC | Facility: CLINIC | Age: 34
End: 2024-03-19
Payer: COMMERCIAL

## 2024-03-19 VITALS
BODY MASS INDEX: 40.78 KG/M2 | SYSTOLIC BLOOD PRESSURE: 136 MMHG | WEIGHT: 221.6 LBS | DIASTOLIC BLOOD PRESSURE: 98 MMHG | HEIGHT: 62 IN

## 2024-03-19 DIAGNOSIS — Z12.4 CERVICAL CANCER SCREENING: ICD-10-CM

## 2024-03-19 DIAGNOSIS — E28.2 PCOS (POLYCYSTIC OVARIAN SYNDROME): Primary | ICD-10-CM

## 2024-03-19 DIAGNOSIS — N91.1 SECONDARY AMENORRHEA: ICD-10-CM

## 2024-03-19 PROCEDURE — 99213 OFFICE O/P EST LOW 20 MIN: CPT | Performed by: OBSTETRICS & GYNECOLOGY

## 2024-03-19 PROCEDURE — G0476 HPV COMBO ASSAY CA SCREEN: HCPCS | Performed by: OBSTETRICS & GYNECOLOGY

## 2024-03-19 PROCEDURE — G0145 SCR C/V CYTO,THINLAYER,RESCR: HCPCS | Performed by: OBSTETRICS & GYNECOLOGY

## 2024-03-19 RX ORDER — MEDROXYPROGESTERONE ACETATE 10 MG/1
10 TABLET ORAL DAILY
Qty: 10 TABLET | Refills: 4 | Status: SHIPPED | OUTPATIENT
Start: 2024-03-19

## 2024-03-19 NOTE — PATIENT INSTRUCTIONS
Medications to consider for pregnancy to discuss with PCP  Labetalol and Procardia   How easy/how long would it be/take to transition to these medications and maintain good blood pressure control?    Weight loss medications  -Phentermine, Wegovy, Ozempic

## 2024-03-19 NOTE — PROGRESS NOTES
Assessment     Infertility due to obesity and anovulation due to PCOS .     Time spent counseling 30 minutes    Plan     Discussed weight loss and with goal BMI of 30 and under. She will discuss weight loss drugs with her PCP     Discussed HTN and need to be on safe medications during pregnancy, especially the first trimester. Her current medication regimen of hydrochlorithiazide and amlodipine has not been proven to be unsafe in pregnancy but are not medications we usually recommend. She will discuss with her PCP about how quickly she could safelty transition to labetalol or Nifedipine when we start the ovulation induction process.    Discussed the need for endometrial protection in the setting of chronic anovulation and risk of endometrial hyperplasia and endometrial cancer. She has opted for intermittent progestin therapy.    Once she has reached her goal weight and blood pressure is under control she will return to discuss and start ovulation induction with letrozole.     Pap smear was collected      Subjective      Sherry Aguila is a 33 y.o. female who presents for evaluation of infertility. Patient and partner have been attempting conception for 2 years.  Marital status: engaged  Pregnancies with current partner: no.    She has PCOS diagnosed by Oligomenorhea and hirsutism; labs wnl, U/S 2022 normal  She has HTN managed on hydrochlorothiazide and norvasc  She is morbidly obese with a BMI 40    Menarche age 10. Irregularity started when she was 14.   Previous birth control: OCP's and nuva ring, both for less than a year  Has not used birth control since 2008    Menstrual cycle hx:   2024: no period  2023: 1 period in August 2022: 3 periods (Jan, July, Oct)    Menstrual and Endocrine History  LMP Patient's last menstrual period was 08/24/2023 (exact date).   Menarche 10   Shortest interval 30   Longest interval 1 year   Duration of flow unknown days   Heavy menses Sometime heavy and sometimes not   Clots  yes   Intermenstrual bleeding no   Postcoital bleeding no   Dysmenorrhea no   Amenorrhea yes  for 1 year   Weight change yes, started at puberty   Hirsutism yes   Balding no   Acne no   Galactorrhea no     Obstetrical History  History: 1x miscarriage in 2011    Gynecologic History  Last PAP 1/21/2020   Previous abdominal or pelvic surgery no   Pelvic pain no   Endometriosis no   Hot flashes no   ANTHONY exposure no   Abnormal Pap no   Cervix Cryo/cone no   Sexually transmitted diseases no   Pelvic inflammatory disease no     Infertility and Endocrine Studies  Basal body temperature no   Endo with biopsy no   Hysterosalpingogram no   Post-coital test no   Laparoscopy no   Hormonal studies no   Semen analysis no   Other studies no   Medications none   Other therapies Not applicable   Insemination not applicable     Sexual History  Frequency 2 or 3 times per week   Satisfied yes   Dyspareunia no   Use of lubricant no   Douching no   Number of lifetime sex partners 6     Contraception  None    Family History  Thyroid problems  no   Heart condition or high blood pressure  yes, HTN in both parents   Blood clot or stroke  no   Diabetes  yes   Cancer  yes, paternal aunt (breast cancer), maternal aunt (colon cancer)   Birth defects/inherited diseases  no   Infectious diseases (mumps, TB, rubella)  no   Other medical problems  yes, Asthma     Habits  Cigarettes:     Patient  -  no     Partner - no, vapes nicotine (daily)  Alcohol:     Patient -  no     Partner - yes, 2 drinks per week for 10 years  Marijuana:    Patient  - no    Partner - no    The following portions of the patient's history were reviewed and updated as appropriate: allergies, current medications, past family history, past medical history, past social history, past surgical history, and problem list.    Review of Systems  Pertinent items are noted in HPI.     Male History and Exam  Name:  Noah      Age: 32  Education: Highschool    Marital History:  engaged  #  "of years with this partner: 3  # of partners: a few    Paternity of Pregnancies:  Number with this partner: 0  Number with other partners: 2  Age of youngest child:  8 and 6    Urologic History:  Infection no   STD no   Mumps no   Varicocele no   Semen analysis no   Undescended testes no   Testicular trauma no   Genital surgery no   Ejaculatory problem no   Impotence no          Objective    Female Exam  /98 (BP Location: Right arm, Patient Position: Sitting, Cuff Size: Large)   Ht 5' 2\" (1.575 m)   Wt 101 kg (221 lb 9.6 oz)   LMP 08/24/2023 (Exact Date)   BMI 40.53 kg/m²   Wt Readings from Last 1 Encounters:   03/19/24 101 kg (221 lb 9.6 oz)     BMI: Body mass index is 40.53 kg/m².  /98 (BP Location: Right arm, Patient Position: Sitting, Cuff Size: Large)   Ht 5' 2\" (1.575 m)   Wt 101 kg (221 lb 9.6 oz)   LMP 08/24/2023 (Exact Date)   BMI 40.53 kg/m²     General Appearance:    Alert, cooperative, no distress, appears stated age   Head:    Normocephalic, without obvious abnormality, atraumatic   Eyes:    PERRL, conjunctiva/corneas clear, EOM's intact, fundi     benign, both eyes   Ears:    Normal TM's and external ear canals, both ears   Nose:   Nares normal, septum midline, mucosa normal, no drainage    or sinus tenderness   Throat:   Lips, mucosa, and tongue normal; teeth and gums normal   Neck:   Supple, symmetrical, trachea midline, no adenopathy;     thyroid:  no enlargement/tenderness/nodules; no carotid    bruit or JVD   Back:     Symmetric, no curvature, ROM normal, no CVA tenderness   Lungs:     Clear to auscultation bilaterally, respirations unlabored   Chest Wall:    No tenderness or deformity    Heart:    Regular rate and rhythm, S1 and S2 normal, no murmur, rub   or gallop   Breast Exam:    No tenderness, masses, or nipple abnormality   Abdomen:     Soft, non-tender, bowel sounds active all four quadrants,     no masses, no organomegaly   Genitalia:    Normal female without lesion, " discharge or tenderness   Rectal:    Normal tone, no masses or tenderness; guaiac negative stool   Extremities:   Extremities normal, atraumatic, no cyanosis or edema   Pulses:   2+ and symmetric all extremities   Skin:   Skin color, texture, turgor normal, no rashes or lesions   Lymph nodes:   Cervical, supraclavicular, and axillary nodes normal   Neurologic:   CNII-XII intact, normal strength, sensation and reflexes     throughout

## 2024-03-20 LAB
HPV HR 12 DNA CVX QL NAA+PROBE: NEGATIVE
HPV16 DNA CVX QL NAA+PROBE: NEGATIVE
HPV18 DNA CVX QL NAA+PROBE: NEGATIVE

## 2024-03-26 LAB
LAB AP GYN PRIMARY INTERPRETATION: NORMAL
Lab: NORMAL

## 2024-03-27 ENCOUNTER — OFFICE VISIT (OUTPATIENT)
Dept: FAMILY MEDICINE CLINIC | Facility: CLINIC | Age: 34
End: 2024-03-27
Payer: COMMERCIAL

## 2024-03-27 VITALS
DIASTOLIC BLOOD PRESSURE: 100 MMHG | WEIGHT: 222.6 LBS | OXYGEN SATURATION: 98 % | BODY MASS INDEX: 40.96 KG/M2 | HEIGHT: 62 IN | SYSTOLIC BLOOD PRESSURE: 140 MMHG | TEMPERATURE: 97.8 F | HEART RATE: 110 BPM

## 2024-03-27 DIAGNOSIS — I10 PRIMARY HYPERTENSION: Primary | ICD-10-CM

## 2024-03-27 DIAGNOSIS — E28.2 PCOS (POLYCYSTIC OVARIAN SYNDROME): ICD-10-CM

## 2024-03-27 PROCEDURE — 99214 OFFICE O/P EST MOD 30 MIN: CPT

## 2024-03-27 RX ORDER — NIFEDIPINE 30 MG/1
30 TABLET, EXTENDED RELEASE ORAL DAILY
Qty: 30 TABLET | Refills: 5 | Status: SHIPPED | OUTPATIENT
Start: 2024-03-27 | End: 2024-09-23

## 2024-03-27 NOTE — PROGRESS NOTES
Name: Sherry Aguila      : 1990      MRN: [id number]  Encounter Provider: AUSTIN Paz  Encounter Date: 3/27/2024   Encounter department: [facility]    Assessment & Plan     1. Primary hypertension  Assessment & Plan:  Today's initial /100 repeat similar   Current medication: Hydrochlorothiazide 12.5mg, Amlodipine 5mg  Patient did not take medication today   Desires pregnancy and medication needs to be changed   New medication: Nefidepine 30mg   Patient has a BP cuff at home   Patient is to take BP daily and keep a record   Follow up in one month    Advised patient on symptoms of hypotension & severe HTN  Limit salt-intake & caffeine. DASH diet discussed, minimize stress level  Weight reduction efforts via improved diet & increased exercise    Orders:  -     NIFEdipine (PROCARDIA XL) 30 mg 24 hr tablet; Take 1 tablet (30 mg total) by mouth daily    2. PCOS (polycystic ovarian syndrome)  Assessment & Plan:  Evaluated by OB/GYN  Symptoms consistent with PCOS  Patient is interested in weight loss in order to help with desire for pregnancy  Discussed options  At this time we will start metformin and repeat weight in 1 month   Patient is to keep a diet and exercise log for 1 month  Patient understands that if she needs to be started on other medications such as GLP-1 agonist that they are is a contraindication of pregnancy during time of treatment    Orders:  -     metFORMIN (GLUCOPHAGE) 500 mg tablet; Take 1 tablet (500 mg total) by mouth daily with breakfast           Subjective      Hypertension and weight loss       Review of Systems   Constitutional:  Negative for activity change, chills, fatigue and fever.   HENT:  Negative for congestion, ear pain, rhinorrhea, sore throat and trouble swallowing.    Eyes:  Negative for pain and visual disturbance.   Respiratory:  Negative for cough, chest tightness and shortness of breath.    Cardiovascular:  Negative for  "chest pain, palpitations and leg swelling.   Gastrointestinal:  Negative for abdominal pain, constipation, diarrhea, nausea and vomiting.   Genitourinary:  Negative for difficulty urinating, dysuria, hematuria and urgency.   Musculoskeletal:  Negative for arthralgias and back pain.   Skin:  Negative for color change and rash.   Neurological:  Negative for dizziness, seizures, syncope and headaches.   Psychiatric/Behavioral:  Negative for dysphoric mood. The patient is not nervous/anxious.    All other systems reviewed and are negative.      Current Outpatient Medications on File Prior to Visit   Medication Sig    Blood Pressure Monitoring (Blood Pressure Mon/Auto/Wrist) WILLY Use 1 Units/day in the morning    fluticasone (FLONASE) 50 mcg/act nasal spray 1 spray into each nostril daily    medroxyPROGESTERone (PROVERA) 10 mg tablet Take 1 tablet (10 mg total) by mouth daily Take 1 tablet daily for 10 days to induce a menstrual cycle every other month    Multiple Vitamins-Minerals (MULTIVITAMIN ADULTS PO) Take 1 tablet by mouth daily    benzonatate (TESSALON PERLES) 100 mg capsule Take 1 capsule (100 mg total) by mouth 3 (three) times a day as needed for cough (Patient not taking: Reported on 3/19/2024)    sertraline (ZOLOFT) 25 mg tablet Take 1 tablet by mouth once daily (Patient not taking: Reported on 3/19/2024)       Objective     /100 (BP Location: Left arm, Patient Position: Sitting, Cuff Size: Adult)   Pulse (!) 110   Temp 97.8 °F (36.6 °C)   Ht 5' 2\" (1.575 m)   Wt 101 kg (222 lb 9.6 oz)   LMP 08/24/2023 (Exact Date)   SpO2 98%   BMI 40.71 kg/m²     Physical Exam  Vitals and nursing note reviewed.   Constitutional:       Appearance: Normal appearance. She is normal weight.   HENT:      Head: Normocephalic.      Right Ear: Tympanic membrane, ear canal and external ear normal. There is no impacted cerumen.      Left Ear: Tympanic membrane, ear canal and external ear normal. There is no impacted " cerumen.      Nose: Nose normal.      Mouth/Throat:      Mouth: Mucous membranes are moist.      Pharynx: Oropharynx is clear.   Eyes:      Extraocular Movements: Extraocular movements intact.      Conjunctiva/sclera: Conjunctivae normal.      Pupils: Pupils are equal, round, and reactive to light.   Cardiovascular:      Rate and Rhythm: Normal rate and regular rhythm.      Pulses: Normal pulses.      Heart sounds: Normal heart sounds.   Pulmonary:      Effort: Pulmonary effort is normal.      Breath sounds: Normal breath sounds.   Abdominal:      General: Bowel sounds are normal. There is no distension.      Palpations: Abdomen is soft.      Tenderness: There is no abdominal tenderness.   Musculoskeletal:         General: Normal range of motion.      Cervical back: Normal range of motion.   Skin:     General: Skin is warm.      Capillary Refill: Capillary refill takes less than 2 seconds.   Neurological:      General: No focal deficit present.      Mental Status: She is alert and oriented to person, place, and time. Mental status is at baseline.   Psychiatric:         Mood and Affect: Mood normal.         Behavior: Behavior normal.         Thought Content: Thought content normal.         Judgment: Judgment normal.       Patient Instructions   Chronic Hypertension   AMBULATORY CARE:   Hypertension is considered chronic  when it continues for 3 months or longer. Hypertension that continues causes your heart to work much harder than normal, which may lead to heart damage. Even if you have hypertension for years, lifestyle changes, medicines, or both may help lower your blood pressure.  Call your local emergency number (911 in the [place]) or have someone call if:   You have chest pain.    You have any of the following signs of a heart attack:      Squeezing, pressure, or pain in your chest    You may  also have any of the following:     Discomfort or pain in your back, neck, jaw, stomach, or arm    Shortness of  breath    Nausea or vomiting    Lightheadedness or a sudden cold sweat    You become confused or have difficulty speaking.    You suddenly feel lightheaded or have trouble breathing.    Seek care immediately if:   You have a severe headache or vision loss.    You have weakness in an arm or leg.    Call your doctor or cardiologist if:   You feel faint, dizzy, confused, or drowsy.    You have been taking your blood pressure medicine but your pressure is higher than your provider says it should be.    You have questions or concerns about your condition or care.    Treatment for chronic hypertension  may include medicine to lower your blood pressure and cholesterol levels. A low cholesterol level helps prevent heart disease and makes it easier to control your blood pressure. Heart disease can make your blood pressure harder to control. You may also need to make lifestyle changes.  What you need to know about the stages of hypertension:  Your healthcare provider will give you a blood pressure goal based on your age, health, and risk for cardiovascular disease. The following are general guidelines on the stages of hypertension:  Normal blood pressure is 119/79 or lower . Your provider may only check your blood pressure each year if it stays at a normal level.    Elevated blood pressure is 120/79 to 129/79 . This is sometimes called prehypertension. Your provider may suggest lifestyle changes to help lower your blood pressure to a normal level. He or she may then check it again in 3 to 6 months.    Stage 1 hypertension is 130/80  to 139/89 . Your provider may recommend lifestyle changes, medication, and checks every 3 to 6 months until your blood pressure is controlled.    Stage 2 hypertension is 140/90 or higher . Your provider will recommend lifestyle changes and have you take 2 kinds of hypertension medicines. You will also need to have your blood pressure checked monthly until it is controlled.       Manage chronic  hypertension:   Check your blood pressure at home.  Do not smoke, have caffeine, or exercise for at least 30 minutes before you check your blood pressure. Sit and rest for 5 minutes before you check your blood pressure. Extend your arm and support it on a flat surface. Your arm should be at the same level as your heart. Follow the directions that came with your blood pressure monitor. Check your blood pressure 2 times, 1 minute apart, before you take your medicine in the morning. Also check your blood pressure before your evening meal. Keep a record of your readings and bring it to your follow-up visits. Your healthcare provider may use the readings to make changes to your treatment plan.         Manage any other health conditions you have.  Health conditions such as diabetes can increase your risk for hypertension. Follow your provider's instructions and take all your medicines as directed. Talk to your provider about any new health conditions you have recently developed.    Ask about all medicines.  Certain medicines can increase your blood pressure. Examples include oral birth control pills, decongestants, herbal supplements, and NSAIDs, such as ibuprofen. Your provider can tell you which medicines are safe for you to take. This includes prescription and over-the-counter medicines.    Lifestyle changes you can make to lower your blood pressure:  Your provider may want you to make more lifestyle changes if you are having trouble controlling your blood pressure. This may feel difficult over time, especially if you think you are making good changes but your pressure is still high. It might help to focus on one new change at a time. For example, try to add 1 more day of exercise, or exercise for an extra 10 minutes on 2 days. Small changes can make a big difference. Your healthcare provider can also refer you to specialists such as a dietitian who can help you make small changes. Your family members may be included  in helping you learn to create lifestyle changes, such as the following:     Limit sodium (salt) as directed.  Too much sodium can affect your fluid balance. Check labels to find low-sodium or no-salt-added foods. Some low-sodium foods use potassium salts for flavor. Too much potassium can also cause health problems. Your provider will tell you how much sodium and potassium are safe for you to have in a day. He or she may recommend that you limit sodium to 2,300 mg a day.         Follow the meal plan recommended by your provider.  A dietitian or your provider can give you more information on low-sodium plans or the DASH (Dietary Approaches to Stop Hypertension) eating plan. The DASH plan is low in sodium, processed sugar, unhealthy fats, and total fat. It is high in potassium, calcium, and fiber. These can be found in vegetables, fruit, and whole-grain foods.         Be physically active throughout the day.  Physical activity, such as exercise, can help control your blood pressure and your weight. Be physically active for at least 30 minutes per day, on most days of the week. Include aerobic activity, such as walking or riding a bicycle. Also include strength training at least 2 times each week. Your provider can help you create a physical activity plan.            Decrease stress.  This may help lower your blood pressure. Learn ways to relax, such as deep breathing or listening to music.    Limit alcohol as directed.  Alcohol can increase your blood pressure. A drink of alcohol is 12 ounces of beer, 5 ounces of wine, or 1½ ounces of liquor. Your provider can help you set daily and weekly drink limits. He or she may recommend no alcohol if your blood pressure stays higher than goal even with medicine or other measures. Ask your provider for information if you need help to quit.    Do not smoke.  Nicotine and other chemicals in cigarettes and cigars can increase your blood pressure and also cause lung damage. Ask  your provider for information if you currently smoke and need help to quit. E-cigarettes or smokeless tobacco still contain nicotine. Talk to your provider before you use these products.       Follow up with your doctor or cardiologist as directed:  You will need to return to have your blood pressure checked and to have other lab tests done. Write down your questions so you remember to ask them during your visits.  © Copyright [employer] 2023 Information is for End User's use only and may not be sold, redistributed or otherwise used for commercial purposes.  The above information is an  only. It is not intended as medical advice for individual conditions or treatments. Talk to your doctor, nurse or pharmacist before following any medical regimen to see if it is safe and effective for you.  Polycystic Ovarian Syndrome   AMBULATORY CARE:   Polycystic ovarian syndrome (PCOS)  is a group of symptoms caused by a hormone disorder. Your body produces too many hormones and your ovaries do not work correctly. Your ovaries have fluid-filled sacs with an immature egg in each one. These are called follicles. The follicles grow bigger and make your ovaries look like they have cysts in them. PCOS increases your risk for endometrial cancer and infertility.       Common signs and symptoms:   Irregular or absent monthly periods    Extreme hair growth on your face, chest, and back    Acne, thinning hair or baldness on your scalp    Weight gain, especially around your abdomen    High blood sugar levels or high blood pressure    Periods of extreme sadness and extreme happiness    Difficulty getting pregnant    Darkening of the skin around your neck creases, groin, and under your breasts    Skin tags in your armpits, or on our neck    Call your doctor or gynecologist if:   Your symptoms get worse.    You have questions or concerns about your condition or care.    Treatment for PCOS  may include any of the  following:  Medicines  may be given to control your blood sugar. You may need medicines to decrease male hormones, and increase female hormones. These medicines may also improve acne, baldness and hair growth you do not want. You may also need medicine to help you ovulate if you want to get pregnant.    Lifestyle changes:      Manage other health conditions.  PCOS increases your risk for diabetes, heart disease, and high blood pressure. Your healthcare provider may send you to specialists that teach you how to manage these conditions.    Maintain a healthy weight.  Ask your healthcare provider how much you should weigh. Ask him or her to help you create a weight loss plan if you are overweight. Weight loss can help reduce the symptoms of PCOS. You and your healthcare provider can set manageable weight loss goals.    Exercise as directed.  Exercise can help keep your blood sugar level steady, lower your risk for heart disease, and help you lose weight. Exercise for at least 150 minutes every week. Spread this amount of exercise over at least 3 days in the week. Do not skip exercise more than 2 days in a row. Include muscle strengthening activities 2 to 3 days each week. Examples of exercise include walking or swimming. Do not sit for longer than 30 minutes at a time. Work with your healthcare provider to create an exercise plan that is right for you.            Eat a variety of healthy foods.  Healthy foods include fruits, vegetables, whole-grain breads, low-fat dairy products, beans, lean meats, and fish. A dietitian may help you plan meals to help manage your other health conditions.       Follow up with your doctor or gynecologist as directed:  You may need to return for more tests or to check if the treatment is working. Write down your questions so you remember to ask them during your visits.  © Copyright [employer] 2023 Information is for End User's use only and may not be sold, redistributed or otherwise used  for commercial purposes.  The above information is an  only. It is not intended as medical advice for individual conditions or treatments. Talk to your doctor, nurse or pharmacist before following any medical regimen to see if it is safe and effective for you.      AUSTIN Paz

## 2024-03-28 NOTE — ASSESSMENT & PLAN NOTE
Evaluated by OB/GYN  Symptoms consistent with PCOS  Patient is interested in weight loss in order to help with desire for pregnancy  Discussed options  At this time we will start metformin and repeat weight in 1 month   Patient is to keep a diet and exercise log for 1 month  Patient understands that if she needs to be started on other medications such as GLP-1 agonist that they are is a contraindication of pregnancy during time of treatment

## 2024-03-28 NOTE — PATIENT INSTRUCTIONS
Chronic Hypertension   AMBULATORY CARE:   Hypertension is considered chronic  when it continues for 3 months or longer. Hypertension that continues causes your heart to work much harder than normal, which may lead to heart damage. Even if you have hypertension for years, lifestyle changes, medicines, or both may help lower your blood pressure.  Call your local emergency number (911 in the [place]) or have someone call if:   You have chest pain.    You have any of the following signs of a heart attack:      Squeezing, pressure, or pain in your chest    You may  also have any of the following:     Discomfort or pain in your back, neck, jaw, stomach, or arm    Shortness of breath    Nausea or vomiting    Lightheadedness or a sudden cold sweat    You become confused or have difficulty speaking.    You suddenly feel lightheaded or have trouble breathing.    Seek care immediately if:   You have a severe headache or vision loss.    You have weakness in an arm or leg.    Call your doctor or cardiologist if:   You feel faint, dizzy, confused, or drowsy.    You have been taking your blood pressure medicine but your pressure is higher than your provider says it should be.    You have questions or concerns about your condition or care.    Treatment for chronic hypertension  may include medicine to lower your blood pressure and cholesterol levels. A low cholesterol level helps prevent heart disease and makes it easier to control your blood pressure. Heart disease can make your blood pressure harder to control. You may also need to make lifestyle changes.  What you need to know about the stages of hypertension:  Your healthcare provider will give you a blood pressure goal based on your age, health, and risk for cardiovascular disease. The following are general guidelines on the stages of hypertension:  Normal blood pressure is 119/79 or lower . Your provider may only check your blood pressure each year if it stays at a normal  level.    Elevated blood pressure is 120/79 to 129/79 . This is sometimes called prehypertension. Your provider may suggest lifestyle changes to help lower your blood pressure to a normal level. He or she may then check it again in 3 to 6 months.    Stage 1 hypertension is 130/80  to 139/89 . Your provider may recommend lifestyle changes, medication, and checks every 3 to 6 months until your blood pressure is controlled.    Stage 2 hypertension is 140/90 or higher . Your provider will recommend lifestyle changes and have you take 2 kinds of hypertension medicines. You will also need to have your blood pressure checked monthly until it is controlled.       Manage chronic hypertension:   Check your blood pressure at home.  Do not smoke, have caffeine, or exercise for at least 30 minutes before you check your blood pressure. Sit and rest for 5 minutes before you check your blood pressure. Extend your arm and support it on a flat surface. Your arm should be at the same level as your heart. Follow the directions that came with your blood pressure monitor. Check your blood pressure 2 times, 1 minute apart, before you take your medicine in the morning. Also check your blood pressure before your evening meal. Keep a record of your readings and bring it to your follow-up visits. Your healthcare provider may use the readings to make changes to your treatment plan.         Manage any other health conditions you have.  Health conditions such as diabetes can increase your risk for hypertension. Follow your provider's instructions and take all your medicines as directed. Talk to your provider about any new health conditions you have recently developed.    Ask about all medicines.  Certain medicines can increase your blood pressure. Examples include oral birth control pills, decongestants, herbal supplements, and NSAIDs, such as ibuprofen. Your provider can tell you which medicines are safe for you to take. This includes  prescription and over-the-counter medicines.    Lifestyle changes you can make to lower your blood pressure:  Your provider may want you to make more lifestyle changes if you are having trouble controlling your blood pressure. This may feel difficult over time, especially if you think you are making good changes but your pressure is still high. It might help to focus on one new change at a time. For example, try to add 1 more day of exercise, or exercise for an extra 10 minutes on 2 days. Small changes can make a big difference. Your healthcare provider can also refer you to specialists such as a dietitian who can help you make small changes. Your family members may be included in helping you learn to create lifestyle changes, such as the following:     Limit sodium (salt) as directed.  Too much sodium can affect your fluid balance. Check labels to find low-sodium or no-salt-added foods. Some low-sodium foods use potassium salts for flavor. Too much potassium can also cause health problems. Your provider will tell you how much sodium and potassium are safe for you to have in a day. He or she may recommend that you limit sodium to 2,300 mg a day.         Follow the meal plan recommended by your provider.  A dietitian or your provider can give you more information on low-sodium plans or the DASH (Dietary Approaches to Stop Hypertension) eating plan. The DASH plan is low in sodium, processed sugar, unhealthy fats, and total fat. It is high in potassium, calcium, and fiber. These can be found in vegetables, fruit, and whole-grain foods.         Be physically active throughout the day.  Physical activity, such as exercise, can help control your blood pressure and your weight. Be physically active for at least 30 minutes per day, on most days of the week. Include aerobic activity, such as walking or riding a bicycle. Also include strength training at least 2 times each week. Your provider can help you create a physical  activity plan.            Decrease stress.  This may help lower your blood pressure. Learn ways to relax, such as deep breathing or listening to music.    Limit alcohol as directed.  Alcohol can increase your blood pressure. A drink of alcohol is 12 ounces of beer, 5 ounces of wine, or 1½ ounces of liquor. Your provider can help you set daily and weekly drink limits. He or she may recommend no alcohol if your blood pressure stays higher than goal even with medicine or other measures. Ask your provider for information if you need help to quit.    Do not smoke.  Nicotine and other chemicals in cigarettes and cigars can increase your blood pressure and also cause lung damage. Ask your provider for information if you currently smoke and need help to quit. E-cigarettes or smokeless tobacco still contain nicotine. Talk to your provider before you use these products.       Follow up with your doctor or cardiologist as directed:  You will need to return to have your blood pressure checked and to have other lab tests done. Write down your questions so you remember to ask them during your visits.  © Copyright [employer] 2023 Information is for End User's use only and may not be sold, redistributed or otherwise used for commercial purposes.  The above information is an  only. It is not intended as medical advice for individual conditions or treatments. Talk to your doctor, nurse or pharmacist before following any medical regimen to see if it is safe and effective for you.  Polycystic Ovarian Syndrome   AMBULATORY CARE:   Polycystic ovarian syndrome (PCOS)  is a group of symptoms caused by a hormone disorder. Your body produces too many hormones and your ovaries do not work correctly. Your ovaries have fluid-filled sacs with an immature egg in each one. These are called follicles. The follicles grow bigger and make your ovaries look like they have cysts in them. PCOS increases your risk for endometrial cancer and  infertility.       Common signs and symptoms:   Irregular or absent monthly periods    Extreme hair growth on your face, chest, and back    Acne, thinning hair or baldness on your scalp    Weight gain, especially around your abdomen    High blood sugar levels or high blood pressure    Periods of extreme sadness and extreme happiness    Difficulty getting pregnant    Darkening of the skin around your neck creases, groin, and under your breasts    Skin tags in your armpits, or on our neck    Call your doctor or gynecologist if:   Your symptoms get worse.    You have questions or concerns about your condition or care.    Treatment for PCOS  may include any of the following:  Medicines  may be given to control your blood sugar. You may need medicines to decrease male hormones, and increase female hormones. These medicines may also improve acne, baldness and hair growth you do not want. You may also need medicine to help you ovulate if you want to get pregnant.    Lifestyle changes:      Manage other health conditions.  PCOS increases your risk for diabetes, heart disease, and high blood pressure. Your healthcare provider may send you to specialists that teach you how to manage these conditions.    Maintain a healthy weight.  Ask your healthcare provider how much you should weigh. Ask him or her to help you create a weight loss plan if you are overweight. Weight loss can help reduce the symptoms of PCOS. You and your healthcare provider can set manageable weight loss goals.    Exercise as directed.  Exercise can help keep your blood sugar level steady, lower your risk for heart disease, and help you lose weight. Exercise for at least 150 minutes every week. Spread this amount of exercise over at least 3 days in the week. Do not skip exercise more than 2 days in a row. Include muscle strengthening activities 2 to 3 days each week. Examples of exercise include walking or swimming. Do not sit for longer than 30 minutes at a  time. Work with your healthcare provider to create an exercise plan that is right for you.            Eat a variety of healthy foods.  Healthy foods include fruits, vegetables, whole-grain breads, low-fat dairy products, beans, lean meats, and fish. A dietitian may help you plan meals to help manage your other health conditions.       Follow up with your doctor or gynecologist as directed:  You may need to return for more tests or to check if the treatment is working. Write down your questions so you remember to ask them during your visits.  © Copyright [employer] 2023 Information is for End User's use only and may not be sold, redistributed or otherwise used for commercial purposes.  The above information is an  only. It is not intended as medical advice for individual conditions or treatments. Talk to your doctor, nurse or pharmacist before following any medical regimen to see if it is safe and effective for you.

## 2024-03-28 NOTE — ASSESSMENT & PLAN NOTE
Today's initial /100 repeat similar   Current medication: Hydrochlorothiazide 12.5mg, Amlodipine 5mg  Patient did not take medication today   Desires pregnancy and medication needs to be changed   New medication: Nefidepine 30mg   Patient has a BP cuff at home   Patient is to take BP daily and keep a record   Follow up in one month    Advised patient on symptoms of hypotension & severe HTN  Limit salt-intake & caffeine. DASH diet discussed, minimize stress level  Weight reduction efforts via improved diet & increased exercise

## 2024-04-24 ENCOUNTER — OFFICE VISIT (OUTPATIENT)
Dept: FAMILY MEDICINE CLINIC | Facility: CLINIC | Age: 34
End: 2024-04-24

## 2024-04-24 VITALS
TEMPERATURE: 98 F | SYSTOLIC BLOOD PRESSURE: 130 MMHG | HEART RATE: 93 BPM | OXYGEN SATURATION: 98 % | HEIGHT: 62 IN | DIASTOLIC BLOOD PRESSURE: 98 MMHG | WEIGHT: 221.4 LBS | BODY MASS INDEX: 40.74 KG/M2

## 2024-04-24 DIAGNOSIS — E28.2 PCOS (POLYCYSTIC OVARIAN SYNDROME): Primary | ICD-10-CM

## 2024-04-24 DIAGNOSIS — I10 PRIMARY HYPERTENSION: ICD-10-CM

## 2024-04-24 PROCEDURE — 99213 OFFICE O/P EST LOW 20 MIN: CPT

## 2024-04-24 NOTE — PROGRESS NOTES
Name: Sherry Aguila      : 1990      MRN: [id number]  Encounter Provider: AUSTIN Paz  Encounter Date: 2024   Encounter department: [facility]    Assessment & Plan     1. PCOS (polycystic ovarian syndrome)  Assessment & Plan:  Evaluated by OB/GYN  Symptoms consistent with PCOS  Patient is interested in weight loss in order to help with desire for pregnancy  Discussed options  At this time we will start metformin and repeat weight in 1 month   Metformin discontinued related to abdominal pain   Patient understands that if she needs to be started on other medications such as a GLP-1 agonist there is a contraindication of pregnancy during time of treatment      2. Primary hypertension  Assessment & Plan:  Today's initial /98    Current medication: Nefidepine 30mg   Patient has a BP cuff at home   BP have been in the 110-130s/70-90s   Advised patient on symptoms of hypotension & severe HTN  Limit salt-intake & caffeine. DASH diet discussed, minimize stress level  Weight reduction efforts via improved diet & increased exercise             Subjective      Follow up exam      Review of Systems   Constitutional:  Negative for activity change, chills, fatigue and fever.   HENT:  Negative for congestion, ear pain, rhinorrhea, sore throat and trouble swallowing.    Eyes:  Negative for pain and visual disturbance.   Respiratory:  Negative for cough, chest tightness and shortness of breath.    Cardiovascular:  Negative for chest pain, palpitations and leg swelling.   Gastrointestinal:  Negative for abdominal pain, constipation, diarrhea, nausea and vomiting.   Genitourinary:  Negative for difficulty urinating, dysuria, hematuria and urgency.   Musculoskeletal:  Negative for arthralgias and back pain.   Skin:  Negative for color change and rash.   Neurological:  Negative for dizziness, seizures, syncope and headaches.   Psychiatric/Behavioral:  Negative for dysphoric  "mood. The patient is not nervous/anxious.    All other systems reviewed and are negative.      Current Outpatient Medications on File Prior to Visit   Medication Sig    Blood Pressure Monitoring (Blood Pressure Mon/Auto/Wrist) WILLY Use 1 Units/day in the morning    fluticasone (FLONASE) 50 mcg/act nasal spray 1 spray into each nostril daily    medroxyPROGESTERone (PROVERA) 10 mg tablet Take 1 tablet (10 mg total) by mouth daily Take 1 tablet daily for 10 days to induce a menstrual cycle every other month    Multiple Vitamins-Minerals (MULTIVITAMIN ADULTS PO) Take 1 tablet by mouth daily    NIFEdipine (PROCARDIA XL) 30 mg 24 hr tablet Take 1 tablet (30 mg total) by mouth daily    benzonatate (TESSALON PERLES) 100 mg capsule Take 1 capsule (100 mg total) by mouth 3 (three) times a day as needed for cough (Patient not taking: Reported on 3/19/2024)    sertraline (ZOLOFT) 25 mg tablet Take 1 tablet by mouth once daily (Patient not taking: Reported on 3/19/2024)       Objective     /98 (BP Location: Left arm, Patient Position: Sitting, Cuff Size: Adult)   Pulse 93   Temp 98 °F (36.7 °C)   Ht 5' 2\" (1.575 m)   Wt 100 kg (221 lb 6.4 oz)   SpO2 98%   BMI 40.49 kg/m²     Physical Exam  Vitals and nursing note reviewed.   Constitutional:       Appearance: Normal appearance. She is normal weight.   HENT:      Head: Normocephalic.      Right Ear: Tympanic membrane, ear canal and external ear normal. There is no impacted cerumen.      Left Ear: Tympanic membrane, ear canal and external ear normal. There is no impacted cerumen.      Nose: Nose normal.      Mouth/Throat:      Mouth: Mucous membranes are moist.      Pharynx: Oropharynx is clear.   Eyes:      Extraocular Movements: Extraocular movements intact.      Conjunctiva/sclera: Conjunctivae normal.      Pupils: Pupils are equal, round, and reactive to light.   Cardiovascular:      Rate and Rhythm: Normal rate and regular rhythm.      Pulses: Normal pulses.      " Heart sounds: Normal heart sounds.   Pulmonary:      Effort: Pulmonary effort is normal.      Breath sounds: Normal breath sounds.   Abdominal:      General: Bowel sounds are normal. There is no distension.      Palpations: Abdomen is soft.      Tenderness: There is no abdominal tenderness.   Musculoskeletal:         General: Normal range of motion.      Cervical back: Normal range of motion.   Skin:     General: Skin is warm.      Capillary Refill: Capillary refill takes less than 2 seconds.   Neurological:      General: No focal deficit present.      Mental Status: She is alert and oriented to person, place, and time. Mental status is at baseline.   Psychiatric:         Mood and Affect: Mood normal.         Behavior: Behavior normal.         Thought Content: Thought content normal.         Judgment: Judgment normal.       AUSTIN Paz

## 2024-04-24 NOTE — ASSESSMENT & PLAN NOTE
Evaluated by OB/GYN  Symptoms consistent with PCOS  Patient is interested in weight loss in order to help with desire for pregnancy  Discussed options  At this time we will start metformin and repeat weight in 1 month   Metformin discontinued related to abdominal pain   Patient understands that if she needs to be started on other medications such as a GLP-1 agonist there is a contraindication of pregnancy during time of treatment

## 2024-04-25 PROBLEM — R93.89 ULTRASOUND SCAN ABNORMAL: Status: RESOLVED | Noted: 2017-12-29 | Resolved: 2024-04-25

## 2024-04-25 PROBLEM — U07.1 COVID-19 VIRUS INFECTION: Status: RESOLVED | Noted: 2020-05-03 | Resolved: 2024-04-25

## 2024-04-25 NOTE — ASSESSMENT & PLAN NOTE
Today's initial /98    Current medication: Nefidepine 30mg   Patient has a BP cuff at home   BP have been in the 110-130s/70-90s   Advised patient on symptoms of hypotension & severe HTN  Limit salt-intake & caffeine. DASH diet discussed, minimize stress level  Weight reduction efforts via improved diet & increased exercise

## 2024-07-19 ENCOUNTER — OFFICE VISIT (OUTPATIENT)
Dept: FAMILY MEDICINE CLINIC | Facility: CLINIC | Age: 34
End: 2024-07-19
Payer: COMMERCIAL

## 2024-07-19 VITALS
HEIGHT: 62 IN | OXYGEN SATURATION: 100 % | SYSTOLIC BLOOD PRESSURE: 146 MMHG | DIASTOLIC BLOOD PRESSURE: 100 MMHG | HEART RATE: 90 BPM | WEIGHT: 212 LBS | BODY MASS INDEX: 39.01 KG/M2 | TEMPERATURE: 98 F

## 2024-07-19 DIAGNOSIS — Z00.00 ANNUAL PHYSICAL EXAM: Primary | ICD-10-CM

## 2024-07-19 DIAGNOSIS — I10 PRIMARY HYPERTENSION: ICD-10-CM

## 2024-07-19 DIAGNOSIS — J45.909 MILD ASTHMA WITHOUT COMPLICATION, UNSPECIFIED WHETHER PERSISTENT: ICD-10-CM

## 2024-07-19 DIAGNOSIS — F41.1 GENERALIZED ANXIETY DISORDER: ICD-10-CM

## 2024-07-19 PROCEDURE — 99214 OFFICE O/P EST MOD 30 MIN: CPT

## 2024-07-19 PROCEDURE — 99385 PREV VISIT NEW AGE 18-39: CPT

## 2024-07-19 RX ORDER — AMLODIPINE BESYLATE 5 MG/1
5 TABLET ORAL DAILY
Qty: 90 TABLET | Refills: 3 | Status: SHIPPED | OUTPATIENT
Start: 2024-07-19

## 2024-07-19 RX ORDER — HYDROCHLOROTHIAZIDE 12.5 MG/1
12.5 TABLET ORAL DAILY
Qty: 90 TABLET | Refills: 3 | Status: SHIPPED | OUTPATIENT
Start: 2024-07-19

## 2024-07-19 NOTE — ASSESSMENT & PLAN NOTE
Today's initial /100  Current medication: Nefidepine 30mg   Stopped taking related to side effects (headaches)   New medications: Hydrochlorothiazide 12.5mg and Amlodipine 5mg   Patient was on this medications previously but was changed due to desire for pregnancy, things have changed and is no longer desiring pregnancy   Patient has a BP cuff at home   BP have been in the 140/100's   Advised patient on symptoms of hypotension & severe HTN  Limit salt-intake & caffeine. DASH diet discussed, minimize stress level  Weight reduction efforts via improved diet & increased exercise

## 2024-07-19 NOTE — PROGRESS NOTES
Adult Annual Physical  Name: Sherry Aguila      : 1990      MRN: [id number]  Encounter Provider: AUSTIN Paz  Encounter Date: 2024   Encounter department: [facility]    Assessment & Plan   1. Annual physical exam  2. Primary hypertension  Assessment & Plan:  Today's initial /100  Current medication: Nefidepine 30mg   Stopped taking related to side effects (headaches)   New medications: Hydrochlorothiazide 12.5mg and Amlodipine 5mg   Patient was on this medications previously but was changed due to desire for pregnancy, things have changed and is no longer desiring pregnancy   Patient has a BP cuff at home   BP have been in the 140/100's   Advised patient on symptoms of hypotension & severe HTN  Limit salt-intake & caffeine. DASH diet discussed, minimize stress level  Weight reduction efforts via improved diet & increased exercise  Orders:  -     hydroCHLOROthiazide 12.5 mg tablet; Take 1 tablet (12.5 mg total) by mouth daily  -     amLODIPine (NORVASC) 5 mg tablet; Take 1 tablet (5 mg total) by mouth daily  3. Mild asthma without complication, unspecified whether persistent  Assessment & Plan:  Albuterol as needed   4. Generalized anxiety disorder  Assessment & Plan:  Well controlled with lifestyle modifications  5. BMI 38.0-38.9,adult    Immunizations and preventive care screenings were discussed with patient today. Appropriate education was printed on patient's after visit summary.    Counseling:  Alcohol/drug use: discussed moderation in alcohol intake, the recommendations for healthy alcohol use, and avoidance of illicit drug use.  Dental Health: discussed importance of regular tooth brushing, flossing, and dental visits.  Injury prevention: discussed safety/seat belts, safety helmets, smoke detectors, carbon dioxide detectors, and smoking near bedding or upholstery.  Sexual health: discussed sexually transmitted diseases, partner selection, use of  "condoms, avoidance of unintended pregnancy, and contraceptive alternatives.  Exercise: the importance of regular exercise/physical activity was discussed. Recommend exercise 3-5 times per week for at least 30 minutes.          History of Present Illness     Adult Annual Physical:  Patient presents for annual physical.     Diet and Physical Activity:  - Diet/Nutrition: well balanced diet.  - Exercise: 5-7 times a week on average.    Depression Screening:  - PHQ-2 Score: 0    General Health:  - Sleep: sleeps well.  - Hearing: normal hearing bilateral ears.  - Vision: vision problems, most recent eye exam < 1 year ago and wears glasses.  - Dental: regular dental visits and brushes teeth twice daily.    /GYN Health:  - Follows with GYN: yes.     Review of Systems   Constitutional:  Negative for activity change, chills, fatigue and fever.   HENT:  Negative for congestion, ear pain, rhinorrhea, sore throat and trouble swallowing.    Eyes:  Negative for pain and visual disturbance.   Respiratory:  Negative for cough, chest tightness and shortness of breath.    Cardiovascular:  Negative for chest pain, palpitations and leg swelling.   Gastrointestinal:  Negative for abdominal pain, constipation, diarrhea, nausea and vomiting.   Genitourinary:  Negative for difficulty urinating, dysuria, hematuria and urgency.   Musculoskeletal:  Negative for arthralgias and back pain.   Skin:  Negative for color change and rash.   Neurological:  Negative for dizziness, seizures, syncope and headaches.   Psychiatric/Behavioral:  Negative for dysphoric mood. The patient is not nervous/anxious.    All other systems reviewed and are negative.    Medical History Reviewed by provider this encounter:  Tobacco  Allergies  Meds  Problems  Med Hx  Surg Hx  Fam Hx         Objective     /100 (BP Location: Left arm, Patient Position: Sitting, Cuff Size: Adult)   Pulse 90   Temp 98 °F (36.7 °C) (Temporal)   Ht 5' 2\" (1.575 m)   Wt 96.2 " kg (212 lb)   SpO2 100%   BMI 38.78 kg/m²     Physical Exam  Vitals and nursing note reviewed.   Constitutional:       General: She is not in acute distress.     Appearance: Normal appearance. She is well-developed and normal weight.   HENT:      Head: Normocephalic and atraumatic.      Right Ear: Tympanic membrane, ear canal and external ear normal. There is no impacted cerumen.      Left Ear: Tympanic membrane, ear canal and external ear normal. There is no impacted cerumen.      Nose: Nose normal.      Mouth/Throat:      Mouth: Mucous membranes are moist.      Pharynx: Oropharynx is clear.   Eyes:      Extraocular Movements: Extraocular movements intact.      Conjunctiva/sclera: Conjunctivae normal.      Pupils: Pupils are equal, round, and reactive to light.   Cardiovascular:      Rate and Rhythm: Normal rate and regular rhythm.      Pulses: Normal pulses.      Heart sounds: Normal heart sounds. No murmur heard.  Pulmonary:      Effort: Pulmonary effort is normal. No respiratory distress.      Breath sounds: Normal breath sounds.   Abdominal:      General: Bowel sounds are normal.      Palpations: Abdomen is soft.      Tenderness: There is no abdominal tenderness.   Musculoskeletal:         General: Normal range of motion.      Cervical back: Normal range of motion and neck supple.      Right lower leg: No edema.      Left lower leg: No edema.   Skin:     General: Skin is warm and dry.      Capillary Refill: Capillary refill takes less than 2 seconds.   Neurological:      General: No focal deficit present.      Mental Status: She is alert and oriented to person, place, and time. Mental status is at baseline.   Psychiatric:         Mood and Affect: Mood normal.         Behavior: Behavior normal.         Thought Content: Thought content normal.         Judgment: Judgment normal.       Administrative Statements   I have spent a total time of 40 minutes in caring for this patient on the day of the visit/encounter  "including Diagnostic results, Prognosis, Risks and benefits of tx options, Instructions for management, Patient and family education, Importance of tx compliance, Risk factor reductions, Impressions, Counseling / Coordination of care, Documenting in the medical record, Reviewing / ordering tests, medicine, procedures  , and Obtaining or reviewing history  .    Patient Instructions   Patient Education     Routine physical for adults   The Basics   Written by the doctors and editors at [facility]   What is a physical? -- A physical is a routine visit, or \"check-up,\" with your doctor. You might also hear it called a \"wellness visit\" or \"preventive visit.\"  During each visit, the doctor will:   Ask about your physical and mental health   Ask about your habits, behaviors, and lifestyle   Do an exam   Give you vaccines if needed   Talk to you about any medicines you take   Give advice about your health   Answer your questions  Getting regular check-ups is an important part of taking care of your health. It can help your doctor find and treat any problems you have. But it's also important for preventing health problems.  A routine physical is different from a \"sick visit.\" A sick visit is when you see a doctor because of a health concern or problem. Since physicals are scheduled ahead of time, you can think about what you want to ask the doctor.  How often should I get a physical? -- It depends on your age and health. In general, for people age 21 years and older:   If you are younger than 50 years, you might be able to get a physical every 3 years.   If you are 50 years or older, your doctor might recommend a physical every year.  If you have an ongoing health condition, like diabetes or high blood pressure, your doctor will probably want to see you more often.  What happens during a physical? -- In general, each visit will include:   Physical exam - The doctor or nurse will check your height, weight, heart rate, and blood " "pressure. They will also look at your eyes and ears. They will ask about how you are feeling and whether you have any symptoms that bother you.   Medicines - It's a good idea to bring a list of all the medicines you take to each doctor visit. Your doctor will talk to you about your medicines and answer any questions. Tell them if you are having any side effects that bother you. You should also tell them if you are having trouble paying for any of your medicines.   Habits and behaviors - This includes:   Your diet   Your exercise habits   Whether you smoke, drink alcohol, or use drugs   Whether you are sexually active   Whether you feel safe at home  Your doctor will talk to you about things you can do to improve your health and lower your risk of health problems. They will also offer help and support. For example, if you want to quit smoking, they can give you advice and might prescribe medicines. If you want to improve your diet or get more physical activity, they can help you with this, too.   Lab tests, if needed - The tests you get will depend on your age and situation. For example, your doctor might want to check your:   Cholesterol   Blood sugar   Iron level   Vaccines - The recommended vaccines will depend on your age, health, and what vaccines you already had. Vaccines are very important because they can prevent certain serious or deadly infections.   Discussion of screening - \"Screening\" means checking for diseases or other health problems before they cause symptoms. Your doctor can recommend screening based on your age, risk, and preferences. This might include tests to check for:   Cancer, such as breast, prostate, cervical, ovarian, colorectal, prostate, lung, or skin cancer   Sexually transmitted infections, such as chlamydia and gonorrhea   Mental health conditions like depression and anxiety  Your doctor will talk to you about the different types of screening tests. They can help you decide which " screenings to have. They can also explain what the results might mean.   Answering questions - The physical is a good time to ask the doctor or nurse questions about your health. If needed, they can refer you to other doctors or specialists, too.  Adults older than 65 years often need other care, too. As you get older, your doctor will talk to you about:   How to prevent falling at home   Hearing or vision tests   Memory testing   How to take your medicines safely   Making sure that you have the help and support you need at home  All topics are updated as new evidence becomes available and our peer review process is complete.  This topic retrieved from [employer] on: May 02, 2024.  Topic 449615 Version 1.0  Release: 32.4.3 - C32.122  © 2024 UpToDate, Inc. and/or its affiliates. All rights reserved.  Consumer Information Use and Disclaimer   Disclaimer: This generalized information is a limited summary of diagnosis, treatment, and/or medication information. It is not meant to be comprehensive and should be used as a tool to help the user understand and/or assess potential diagnostic and treatment options. It does NOT include all information about conditions, treatments, medications, side effects, or risks that may apply to a specific patient. It is not intended to be medical advice or a substitute for the medical advice, diagnosis, or treatment of a health care provider based on the health care provider's examination and assessment of a patient's specific and unique circumstances. Patients must speak with a health care provider for complete information about their health, medical questions, and treatment options, including any risks or benefits regarding use of medications. This information does not endorse any treatments or medications as safe, effective, or approved for treating a specific patient. UpToDate, Inc. and its affiliates disclaim any warranty or liability relating to this information or the use thereof.The  use of this information is governed by the Terms of Use, available at https://www.woltersBabbleuwer.com/en/know/clinical-effectiveness-terms. 2024© UpToDate, Inc. and its affiliates and/or licensors. All rights reserved.  Copyright   © 2024 [employer] and/or its affiliates. All rights reserved.

## 2024-07-19 NOTE — PATIENT INSTRUCTIONS
"Patient Education     Routine physical for adults   The Basics   Written by the doctors and editors at [facility]   What is a physical? -- A physical is a routine visit, or \"check-up,\" with your doctor. You might also hear it called a \"wellness visit\" or \"preventive visit.\"  During each visit, the doctor will:   Ask about your physical and mental health   Ask about your habits, behaviors, and lifestyle   Do an exam   Give you vaccines if needed   Talk to you about any medicines you take   Give advice about your health   Answer your questions  Getting regular check-ups is an important part of taking care of your health. It can help your doctor find and treat any problems you have. But it's also important for preventing health problems.  A routine physical is different from a \"sick visit.\" A sick visit is when you see a doctor because of a health concern or problem. Since physicals are scheduled ahead of time, you can think about what you want to ask the doctor.  How often should I get a physical? -- It depends on your age and health. In general, for people age 21 years and older:   If you are younger than 50 years, you might be able to get a physical every 3 years.   If you are 50 years or older, your doctor might recommend a physical every year.  If you have an ongoing health condition, like diabetes or high blood pressure, your doctor will probably want to see you more often.  What happens during a physical? -- In general, each visit will include:   Physical exam - The doctor or nurse will check your height, weight, heart rate, and blood pressure. They will also look at your eyes and ears. They will ask about how you are feeling and whether you have any symptoms that bother you.   Medicines - It's a good idea to bring a list of all the medicines you take to each doctor visit. Your doctor will talk to you about your medicines and answer any questions. Tell them if you are having any side effects that bother you. You " "should also tell them if you are having trouble paying for any of your medicines.   Habits and behaviors - This includes:   Your diet   Your exercise habits   Whether you smoke, drink alcohol, or use drugs   Whether you are sexually active   Whether you feel safe at home  Your doctor will talk to you about things you can do to improve your health and lower your risk of health problems. They will also offer help and support. For example, if you want to quit smoking, they can give you advice and might prescribe medicines. If you want to improve your diet or get more physical activity, they can help you with this, too.   Lab tests, if needed - The tests you get will depend on your age and situation. For example, your doctor might want to check your:   Cholesterol   Blood sugar   Iron level   Vaccines - The recommended vaccines will depend on your age, health, and what vaccines you already had. Vaccines are very important because they can prevent certain serious or deadly infections.   Discussion of screening - \"Screening\" means checking for diseases or other health problems before they cause symptoms. Your doctor can recommend screening based on your age, risk, and preferences. This might include tests to check for:   Cancer, such as breast, prostate, cervical, ovarian, colorectal, prostate, lung, or skin cancer   Sexually transmitted infections, such as chlamydia and gonorrhea   Mental health conditions like depression and anxiety  Your doctor will talk to you about the different types of screening tests. They can help you decide which screenings to have. They can also explain what the results might mean.   Answering questions - The physical is a good time to ask the doctor or nurse questions about your health. If needed, they can refer you to other doctors or specialists, too.  Adults older than 65 years often need other care, too. As you get older, your doctor will talk to you about:   How to prevent falling at " home   Hearing or vision tests   Memory testing   How to take your medicines safely   Making sure that you have the help and support you need at home  All topics are updated as new evidence becomes available and our peer review process is complete.  This topic retrieved from [employer] on: May 02, 2024.  Topic 685803 Version 1.0  Release: 32.4.3 - C32.122  © 2024 UpToDate, Inc. and/or its affiliates. All rights reserved.  Consumer Information Use and Disclaimer   Disclaimer: This generalized information is a limited summary of diagnosis, treatment, and/or medication information. It is not meant to be comprehensive and should be used as a tool to help the user understand and/or assess potential diagnostic and treatment options. It does NOT include all information about conditions, treatments, medications, side effects, or risks that may apply to a specific patient. It is not intended to be medical advice or a substitute for the medical advice, diagnosis, or treatment of a health care provider based on the health care provider's examination and assessment of a patient's specific and unique circumstances. Patients must speak with a health care provider for complete information about their health, medical questions, and treatment options, including any risks or benefits regarding use of medications. This information does not endorse any treatments or medications as safe, effective, or approved for treating a specific patient. UpToDate, Inc. and its affiliates disclaim any warranty or liability relating to this information or the use thereof.The use of this information is governed by the Terms of Use, available at https://www.woltersXOS Digitaluwer.com/en/know/clinical-effectiveness-terms. 2024© UpToDate, Inc. and its affiliates and/or licensors. All rights reserved.  Copyright   © 2024 UpToDate, Inc. and/or its affiliates. All rights reserved.

## 2024-10-06 ENCOUNTER — HOSPITAL ENCOUNTER (EMERGENCY)
Facility: HOSPITAL | Age: 34
Discharge: HOME | End: 2024-10-06
Attending: EMERGENCY MEDICINE
Payer: COMMERCIAL

## 2024-10-06 ENCOUNTER — APPOINTMENT (EMERGENCY)
Dept: RADIOLOGY | Facility: HOSPITAL | Age: 34
End: 2024-10-06
Payer: COMMERCIAL

## 2024-10-06 VITALS
TEMPERATURE: 97.6 F | RESPIRATION RATE: 18 BRPM | SYSTOLIC BLOOD PRESSURE: 148 MMHG | DIASTOLIC BLOOD PRESSURE: 98 MMHG | HEART RATE: 95 BPM | OXYGEN SATURATION: 98 %

## 2024-10-06 DIAGNOSIS — M62.830 PARASPINAL MUSCLE SPASM: Primary | ICD-10-CM

## 2024-10-06 DIAGNOSIS — B37.9 YEAST INFECTION: ICD-10-CM

## 2024-10-06 LAB
ANION GAP SERPL CALCULATED.3IONS-SCNC: 8 MMOL/L (ref 4–13)
BACTERIA UR QL AUTO: ABNORMAL /HPF
BASOPHILS # BLD AUTO: 0.08 THOUSANDS/ΜL (ref 0–0.1)
BASOPHILS NFR BLD AUTO: 1 % (ref 0–1)
BILIRUB UR QL STRIP: NEGATIVE
BUN SERPL-MCNC: 13 MG/DL (ref 5–25)
CALCIUM SERPL-MCNC: 9.3 MG/DL (ref 8.4–10.2)
CHLORIDE SERPL-SCNC: 102 MMOL/L (ref 96–108)
CLARITY UR: CLEAR
CO2 SERPL-SCNC: 29 MMOL/L (ref 21–32)
COLOR UR: YELLOW
CREAT SERPL-MCNC: 0.66 MG/DL (ref 0.6–1.3)
EOSINOPHIL # BLD AUTO: 0.09 THOUSAND/ΜL (ref 0–0.61)
EOSINOPHIL NFR BLD AUTO: 1 % (ref 0–6)
ERYTHROCYTE [DISTWIDTH] IN BLOOD BY AUTOMATED COUNT: 12.1 % (ref 11.6–15.1)
EXT PREGNANCY TEST URINE: NEGATIVE
EXT. CONTROL: NORMAL
GFR SERPL CREATININE-BSD FRML MDRD: 115 ML/MIN/1.73SQ M
GLUCOSE SERPL-MCNC: 87 MG/DL (ref 65–140)
GLUCOSE UR STRIP-MCNC: NEGATIVE MG/DL
HCT VFR BLD AUTO: 45.4 % (ref 34.8–46.1)
HGB BLD-MCNC: 15.4 G/DL (ref 11.5–15.4)
HGB UR QL STRIP.AUTO: NEGATIVE
IMM GRANULOCYTES # BLD AUTO: 0.03 THOUSAND/UL (ref 0–0.2)
IMM GRANULOCYTES NFR BLD AUTO: 0 % (ref 0–2)
KETONES UR STRIP-MCNC: NEGATIVE MG/DL
LEUKOCYTE ESTERASE UR QL STRIP: ABNORMAL
LYMPHOCYTES # BLD AUTO: 2.42 THOUSANDS/ΜL (ref 0.6–4.47)
LYMPHOCYTES NFR BLD AUTO: 30 % (ref 14–44)
MCH RBC QN AUTO: 29.9 PG (ref 26.8–34.3)
MCHC RBC AUTO-ENTMCNC: 33.9 G/DL (ref 31.4–37.4)
MCV RBC AUTO: 88 FL (ref 82–98)
MONOCYTES # BLD AUTO: 0.42 THOUSAND/ΜL (ref 0.17–1.22)
MONOCYTES NFR BLD AUTO: 5 % (ref 4–12)
MUCOUS THREADS UR QL AUTO: ABNORMAL
NEUTROPHILS # BLD AUTO: 4.92 THOUSANDS/ΜL (ref 1.85–7.62)
NEUTS SEG NFR BLD AUTO: 63 % (ref 43–75)
NITRITE UR QL STRIP: NEGATIVE
NON-SQ EPI CELLS URNS QL MICRO: ABNORMAL /HPF
NRBC BLD AUTO-RTO: 0 /100 WBCS
PH UR STRIP.AUTO: 6 [PH]
PLATELET # BLD AUTO: 413 THOUSANDS/UL (ref 149–390)
PMV BLD AUTO: 11.3 FL (ref 8.9–12.7)
POTASSIUM SERPL-SCNC: 3.2 MMOL/L (ref 3.5–5.3)
PROT UR STRIP-MCNC: NEGATIVE MG/DL
RBC # BLD AUTO: 5.15 MILLION/UL (ref 3.81–5.12)
RBC #/AREA URNS AUTO: ABNORMAL /HPF
SODIUM SERPL-SCNC: 139 MMOL/L (ref 135–147)
SP GR UR STRIP.AUTO: 1.02 (ref 1–1.03)
UROBILINOGEN UR STRIP-ACNC: <2 MG/DL
WBC # BLD AUTO: 7.96 THOUSAND/UL (ref 4.31–10.16)
WBC #/AREA URNS AUTO: ABNORMAL /HPF

## 2024-10-06 PROCEDURE — 85025 COMPLETE CBC W/AUTO DIFF WBC: CPT

## 2024-10-06 PROCEDURE — 99284 EMERGENCY DEPT VISIT MOD MDM: CPT | Performed by: EMERGENCY MEDICINE

## 2024-10-06 PROCEDURE — 81001 URINALYSIS AUTO W/SCOPE: CPT

## 2024-10-06 PROCEDURE — 81025 URINE PREGNANCY TEST: CPT

## 2024-10-06 PROCEDURE — 80048 BASIC METABOLIC PNL TOTAL CA: CPT

## 2024-10-06 PROCEDURE — 74176 CT ABD & PELVIS W/O CONTRAST: CPT

## 2024-10-06 PROCEDURE — 99284 EMERGENCY DEPT VISIT MOD MDM: CPT

## 2024-10-06 PROCEDURE — 36415 COLL VENOUS BLD VENIPUNCTURE: CPT

## 2024-10-06 RX ORDER — METHOCARBAMOL 500 MG/1
500 TABLET, FILM COATED ORAL 2 TIMES DAILY
Qty: 20 TABLET | Refills: 0 | Status: SHIPPED | OUTPATIENT
Start: 2024-10-06

## 2024-10-06 RX ORDER — FLUCONAZOLE 150 MG/1
150 TABLET ORAL ONCE
Status: COMPLETED | OUTPATIENT
Start: 2024-10-06 | End: 2024-10-06

## 2024-10-06 RX ORDER — LIDOCAINE 50 MG/G
1 PATCH TOPICAL ONCE
Status: DISCONTINUED | OUTPATIENT
Start: 2024-10-06 | End: 2024-10-06 | Stop reason: HOSPADM

## 2024-10-06 RX ADMIN — FLUCONAZOLE 150 MG: 150 TABLET ORAL at 14:18

## 2024-10-06 RX ADMIN — LIDOCAINE 1 PATCH: 50 PATCH TOPICAL at 14:26

## 2024-10-06 NOTE — ED ATTENDING ATTESTATION
10/6/2024  I, Nelson Brown MD, saw and evaluated the patient. I have discussed the patient with the resident/non-physician practitioner and agree with the resident's/non-physician practitioner's findings, Plan of Care, and MDM as documented in the resident's/non-physician practitioner's note, except where noted. All available labs and Radiology studies were reviewed.  I was present for key portions of any procedure(s) performed by the resident/non-physician practitioner and I was immediately available to provide assistance.       At this point I agree with the current assessment done in the Emergency Department.  I have conducted an independent evaluation of this patient a history and physical is as follows:    ED Course     34-year-old female presenting to the emergency department for evaluation of left-sided flank pain and concern of urinary tract infection as well as vaginal itching.  Patient states that starting approximately 1 month ago she has had intermittent less left flank discomfort.  Patient was seen in urgent care center 2 to 3 weeks ago for suprapubic and left lower quadrant abdominal pressure and was diagnosed with urinary tract infection and subsequently treated.  Patient states that she has 1 more dose of antibiotic to take, and has noticed vaginal itching without vaginal discharge.  No reported fever.  No abdominal pain, nausea, vomiting.  No diarrhea.    The patient is resting comfortably on a stretcher in no acute respiratory distress. The patient appears nontoxic. HEENT reveals moist mucous membranes. Head is normocephalic and atraumatic. Conjunctiva and sclera are normal. Neck is nontender and supple with full range of motion to flexion, extension, lateral rotation. No meningismus appreciated. No masses are appreciated. Lungs are clear to auscultation bilaterally without any wheezes, rales or rhonchi. Heart is regular rate and rhythm without any murmurs, rubs or gallops. Abdomen is soft  and nontender without any rebound or guarding. Extremities appear grossly normal without any significant arthropathy.  No left CVA tenderness.  The patient has mild right CVA tenderness.  Patient is awake, alert, and oriented x3. The patient has normal interaction.  Cranial nerves grossly intact.  Motor is 5 out of 5 bilateral upper and lower extremities.    MEDICAL DECISION MAKING    Number and Complexity of Problems  Differential diagnosis: Musculoskeletal back pain, renal stone, pyelonephritis, urinary tract infection, yeast infection.    Medical Decision Making Data  External documents reviewed: Reviewed last primary care physician office visit from 7/19/2024.  My CT interpretation: No evidence of hydronephrosis or renal stones.  My ultrasound interpretation: I was present at the time that point-of-care ultrasound was performed demonstrating no hydronephrosis bilaterally.    CT renal stone study abdomen pelvis wo contrast   Final Result   No urinary calculi. No acute findings.            Workstation performed: [id number]             Labs Reviewed   UA W REFLEX TO MICROSCOPIC WITH REFLEX TO CULTURE - Abnormal       Result Value Ref Range Status    Color, UA Yellow   Final    Clarity, UA Clear   Final    Specific Gravity, UA 1.019  1.003 - 1.030 Final    pH, UA 6.0  4.5, 5.0, 5.5, 6.0, 6.5, 7.0, 7.5, 8.0 Final    Leukocytes, UA Moderate (*) Negative Final    Nitrite, UA Negative  Negative Final    Protein, UA Negative  Negative mg/dl Final    Glucose, UA Negative  Negative mg/dl Final    Ketones, UA Negative  Negative mg/dl Final    Urobilinogen, UA <2.0  <2.0 mg/dl mg/dl Final    Bilirubin, UA Negative  Negative Final    Occult Blood, UA Negative  Negative Final   CBC AND DIFFERENTIAL - Abnormal    WBC 7.96  4.31 - 10.16 Thousand/uL Final    RBC 5.15 (*) 3.81 - 5.12 Million/uL Final    Hemoglobin 15.4  11.5 - 15.4 g/dL Final    Hematocrit 45.4  34.8 - 46.1 % Final    MCV 88  82 - 98 fL Final    MCH 29.9  26.8 -  34.3 pg Final    MCHC 33.9  31.4 - 37.4 g/dL Final    RDW 12.1  11.6 - 15.1 % Final    MPV 11.3  8.9 - 12.7 fL Final    Platelets 413 (*) 149 - 390 Thousands/uL Final    nRBC 0  /100 WBCs Final    Segmented % 63  43 - 75 % Final    Immature Grans % 0  0 - 2 % Final    Lymphocytes % 30  14 - 44 % Final    Monocytes % 5  4 - 12 % Final    Eosinophils Relative 1  0 - 6 % Final    Basophils Relative 1  0 - 1 % Final    Absolute Neutrophils 4.92  1.85 - 7.62 Thousands/µL Final    Absolute Immature Grans 0.03  0.00 - 0.20 Thousand/uL Final    Absolute Lymphocytes 2.42  0.60 - 4.47 Thousands/µL Final    Absolute Monocytes 0.42  0.17 - 1.22 Thousand/µL Final    Eosinophils Absolute 0.09  0.00 - 0.61 Thousand/µL Final    Basophils Absolute 0.08  0.00 - 0.10 Thousands/µL Final   BASIC METABOLIC PANEL - Abnormal    Sodium 139  135 - 147 mmol/L Final    Potassium 3.2 (*) 3.5 - 5.3 mmol/L Final    Chloride 102  96 - 108 mmol/L Final    CO2 29  21 - 32 mmol/L Final    ANION GAP 8  4 - 13 mmol/L Final    BUN 13  5 - 25 mg/dL Final    Creatinine 0.66  0.60 - 1.30 mg/dL Final    Comment: Standardized to IDMS reference method    Glucose 87  65 - 140 mg/dL Final    Comment: If the patient is fasting, the ADA then defines impaired fasting glucose as > 100 mg/dL and diabetes as > or equal to 123 mg/dL.    Calcium 9.3  8.4 - 10.2 mg/dL Final    eGFR 115  ml/min/1.73sq m Final    Narrative:     National Kidney Disease Foundation guidelines for Chronic Kidney Disease (CKD):     Stage 1 with normal or high GFR (GFR > 90 mL/min/1.73 square meters)    Stage 2 Mild CKD (GFR = 60-89 mL/min/1.73 square meters)    Stage 3A Moderate CKD (GFR = 45-59 mL/min/1.73 square meters)    Stage 3B Moderate CKD (GFR = 30-44 mL/min/1.73 square meters)    Stage 4 Severe CKD (GFR = 15-29 mL/min/1.73 square meters)    Stage 5 End Stage CKD (GFR <15 mL/min/1.73 square meters)  Note: GFR calculation is accurate only with a steady state creatinine   URINE  MICROSCOPIC - Abnormal    RBC, UA None Seen  None Seen, 1-2 /hpf Final    WBC, UA 2-4 (*) None Seen, 1-2 /hpf Final    Epithelial Cells Occasional  None Seen, Occasional /hpf Final    Bacteria, UA Occasional  None Seen, Occasional /hpf Final    MUCUS THREADS Innumerable (*) None Seen Final   POCT PREGNANCY, URINE - Normal    EXT Preg Test, Ur Negative   Final    Control Valid   Final       Labs reviewed by me are significant for:  Urine microscopy without signs of infection.    Treatment and Disposition  ED course: Patient underwent evaluation for renal stone as well as urinary tract infection without findings to suggest another process were taking place.  Offered pelvic exam for the vaginal itching, but patient declined, and will be treated empirically with Diflucan for yeast infection given recent antibiotic use.  Shared decision making: Patient agreeable with plan.  Code status: Full code.              Critical Care Time  Procedures

## 2024-10-13 NOTE — ED PROVIDER NOTES
Time reflects when diagnosis was documented in both MDM as applicable and the Disposition within this note       Time User Action Codes Description Comment    10/6/2024  2:21 PM MateoDarnell perez Fiordaliza [M62.830] Paraspinal muscle spasm     10/6/2024  2:21 PM Darnell Galindo Fiordaliza [B37.9] Yeast infection           ED Disposition       ED Disposition   Discharge    Condition   Stable    Date/Time   Sun Oct 6, 2024  2:21 PM    Comment   Sherry Aguila discharge to home/self care.                   Assessment & Plan       Medical Decision Making  Differential can be renal stone, ascending urinary tract infection, fungal vaginal infection, viral/bacterial gastrointestinal illness.  Will obtain labs to look for source of infection and electrolyte abnormalities.  Will also obtain a UA to look for signs of infection.  Will also get a CT renal stone study to look for stone.  In addition to a pregnancy test.    No stone noted and lab findings not consistent with ongoing infection.  Will treat with Diflucan at this time.  The pain that she is feeling likely musculoskeletal.  However having her follow-up with her primary care physician.  In addition we will have her use Motrin and Tylenol as needed for pain relief.  Giving her return precautions if pain worsens.    Amount and/or Complexity of Data Reviewed  External Data Reviewed: labs and notes.  Labs: ordered. Decision-making details documented in ED Course.  Radiology: ordered.    Risk  Prescription drug management.        ED Course as of 10/12/24 2048   Sun Oct 06, 2024   1244 PREGNANCY TEST URINE: Negative   1244 Control: Valid   1307 Leukocytes, UA(!): Moderate   1325 Bacteria, UA: Occasional   1325 WBC, UA(!): 2-4       Medications   fluconazole (DIFLUCAN) tablet 150 mg (150 mg Oral Given 10/6/24 1418)       ED Risk Strat Scores                                               History of Present Illness       Chief Complaint   Patient presents with    Flank Pain     L sided flank  pain, taking antibiotic for UTI, hasn't finished pack yet. Now starting with vaginal irritation and itching.       Past Medical History:   Diagnosis Date    Allergic     Anxiety     Asthma     COVID-19 virus infection 05/03/2020    Depression     Headache(784.0)     Migraine     Miscarriage 4/7/2011    Ultrasound scan abnormal 12/29/2017    Last Assessment & Plan:   Small cystic area noted in endometrium  Incidental finding  No pain/no bleeding  Recommend repeat u/s in 3 weeks    UTI (urinary tract infection)       History reviewed. No pertinent surgical history.   Family History   Problem Relation Age of Onset    Hypertension Mother     Asthma Mother     Arthritis Mother     Miscarriages / Stillbirths Mother     Hypertension Father     Coronary artery disease Father     Hyperlipidemia Father     Diabetes Father     COPD Father     Heart attack Father     Heart failure Father     Hypertension Paternal Aunt     Hypertension Paternal Uncle     Breast cancer Cousin     Colon cancer Cousin     Ovarian cancer Cousin     Miscarriages / Stillbirths Sister       Social History     Tobacco Use    Smoking status: Never     Passive exposure: Never    Smokeless tobacco: Never   Vaping Use    Vaping status: Never Used   Substance Use Topics    Alcohol use: Not Currently     Comment: Social    Drug use: No      E-Cigarette/Vaping    E-Cigarette Use Never User       E-Cigarette/Vaping Substances    Nicotine No     THC No     CBD No     Flavoring No     Other No     Unknown No       I have reviewed and agree with the history as documented.     34-year-old female past medical history of UTI, hypertension, asthma, patient coming in complaining of left-sided flank pain and was diagnosed with a UTI given antibiotics.  She says that now she is complaining of vaginal dryness/itching. She says approximately 2 to 3 weeks ago she was diagnosed with urinary tract infection treated with antibiotics.  She says that the vaginal itching started  after that.  She says that she does not have any vaginal discharge.  She says she took 1 dose of Monistat.  She states that she does not have a history of kidney stones.  She denies any headache, dizziness, chest pain, shortness of breath, nausea, vomiting, diarrhea.  Denies dysuria.        Review of Systems        Objective       ED Triage Vitals [10/06/24 1153]   Temperature Pulse Blood Pressure Respirations SpO2 Patient Position - Orthostatic VS   97.6 °F (36.4 °C) 95 148/98 18 98 % --      Temp Source Heart Rate Source BP Location FiO2 (%) Pain Score    Temporal Monitor -- -- 3      Vitals      Date and Time Temp Pulse SpO2 Resp BP Pain Score FACES Pain Rating User   10/06/24 1153 97.6 °F (36.4 °C) 95 98 % 18 148/98 3 -- CS            Physical Exam  Vitals and nursing note reviewed.   Constitutional:       Appearance: Normal appearance. She is well-developed.   HENT:      Head: Normocephalic and atraumatic.      Nose: Nose normal.      Mouth/Throat:      Mouth: Mucous membranes are moist.      Pharynx: No oropharyngeal exudate or posterior oropharyngeal erythema.   Eyes:      Extraocular Movements: Extraocular movements intact.      Conjunctiva/sclera: Conjunctivae normal.      Pupils: Pupils are equal, round, and reactive to light.   Cardiovascular:      Rate and Rhythm: Normal rate and regular rhythm.      Pulses: Normal pulses.      Heart sounds: Normal heart sounds.   Pulmonary:      Effort: Pulmonary effort is normal.      Breath sounds: Normal breath sounds.   Abdominal:      General: Bowel sounds are normal. There is no distension.      Palpations: Abdomen is soft.      Tenderness: There is no abdominal tenderness. There is no guarding or rebound.   Musculoskeletal:         General: Normal range of motion.      Cervical back: Normal range of motion and neck supple.      Right lower leg: No edema.      Left lower leg: No edema.   Skin:     General: Skin is warm and dry.      Capillary Refill: Capillary  refill takes less than 2 seconds.   Neurological:      General: No focal deficit present.      Mental Status: She is alert and oriented to person, place, and time.      Cranial Nerves: No cranial nerve deficit.   Psychiatric:         Mood and Affect: Mood normal.         Behavior: Behavior normal.         Results Reviewed       Procedure Component Value Units Date/Time    Urine Microscopic [[id number]]  (Abnormal) Collected: 10/06/24 1239    Lab Status: Final result Specimen: Urine, Clean Catch Updated: 10/06/24 1324     RBC, UA None Seen /hpf      WBC, UA 2-4 /hpf      Epithelial Cells Occasional /hpf      Bacteria, UA Occasional /hpf      MUCUS THREADS Innumerable    Basic metabolic panel [[id number]]  (Abnormal) Collected: 10/06/24 1239    Lab Status: Final result Specimen: Blood from Arm, Left Updated: 10/06/24 1312     Sodium 139 mmol/L      Potassium 3.2 mmol/L      Chloride 102 mmol/L      CO2 29 mmol/L      ANION GAP 8 mmol/L      BUN 13 mg/dL      Creatinine 0.66 mg/dL      Glucose 87 mg/dL      Calcium 9.3 mg/dL      eGFR 115 ml/min/1.73sq m     Narrative:      National Kidney Disease Foundation guidelines for Chronic Kidney Disease (CKD):     Stage 1 with normal or high GFR (GFR > 90 mL/min/1.73 square meters)    Stage 2 Mild CKD (GFR = 60-89 mL/min/1.73 square meters)    Stage 3A Moderate CKD (GFR = 45-59 mL/min/1.73 square meters)    Stage 3B Moderate CKD (GFR = 30-44 mL/min/1.73 square meters)    Stage 4 Severe CKD (GFR = 15-29 mL/min/1.73 square meters)    Stage 5 End Stage CKD (GFR <15 mL/min/1.73 square meters)  Note: GFR calculation is accurate only with a steady state creatinine    UA w Reflex to Microscopic w Reflex to Culture [[id number]]  (Abnormal) Collected: 10/06/24 1239    Lab Status: Final result Specimen: Urine, Clean Catch Updated: 10/06/24 1258     Color, UA Yellow     Clarity, UA Clear     Specific Gravity, UA 1.019     pH, UA 6.0     Leukocytes, UA Moderate     Nitrite, UA Negative      Protein, UA Negative mg/dl      Glucose, UA Negative mg/dl      Ketones, UA Negative mg/dl      Urobilinogen, UA <2.0 mg/dl      Bilirubin, UA Negative     Occult Blood, UA Negative    CBC and differential [[id number]]  (Abnormal) Collected: 10/06/24 1239    Lab Status: Final result Specimen: Blood from Arm, Left Updated: 10/06/24 1249     WBC 7.96 Thousand/uL      RBC 5.15 Million/uL      Hemoglobin 15.4 g/dL      Hematocrit 45.4 %      MCV 88 fL      MCH 29.9 pg      MCHC 33.9 g/dL      RDW 12.1 %      MPV 11.3 fL      Platelets 413 Thousands/uL      nRBC 0 /100 WBCs      Segmented % 63 %      Immature Grans % 0 %      Lymphocytes % 30 %      Monocytes % 5 %      Eosinophils Relative 1 %      Basophils Relative 1 %      Absolute Neutrophils 4.92 Thousands/µL      Absolute Immature Grans 0.03 Thousand/uL      Absolute Lymphocytes 2.42 Thousands/µL      Absolute Monocytes 0.42 Thousand/µL      Eosinophils Absolute 0.09 Thousand/µL      Basophils Absolute 0.08 Thousands/µL     POCT pregnancy, urine [[id number]]  (Normal) Resulted: 10/06/24 124    Lab Status: Final result Updated: 10/06/24 124     EXT Preg Test, Ur Negative     Control Valid            CT renal stone study abdomen pelvis wo contrast   Final Interpretation by Ishmael King MD (10/06 1347)   No urinary calculi. No acute findings.            Workstation performed: [id number]             Procedures    ED Medication and Procedure Management   Prior to Admission Medications   Prescriptions Last Dose Informant Patient Reported? Taking?   Blood Pressure Monitoring (Blood Pressure Mon/Auto/Wrist) WILLY   No No   Sig: Use 1 Units/day in the morning   Multiple Vitamins-Minerals (MULTIVITAMIN ADULTS PO)  Self Yes No   Sig: Take 1 tablet by mouth daily   amLODIPine (NORVASC) 5 mg tablet   No No   Sig: Take 1 tablet (5 mg total) by mouth daily   fluticasone (FLONASE) 50 mcg/act nasal spray   No No   Si spray into each nostril daily   hydroCHLOROthiazide 12.5 mg  tablet   No No   Sig: Take 1 tablet (12.5 mg total) by mouth daily   medroxyPROGESTERone (PROVERA) 10 mg tablet   No No   Sig: Take 1 tablet (10 mg total) by mouth daily Take 1 tablet daily for 10 days to induce a menstrual cycle every other month      Facility-Administered Medications: None     Discharge Medication List as of 10/6/2024  2:21 PM        CONTINUE these medications which have NOT CHANGED    Details   amLODIPine (NORVASC) 5 mg tablet Take 1 tablet (5 mg total) by mouth daily, Starting Fri 7/19/2024, Normal      Blood Pressure Monitoring (Blood Pressure Mon/Auto/Wrist) WILLY Use 1 Units/day in the morning, Starting Wed 11/30/2022, Normal      fluticasone (FLONASE) 50 mcg/act nasal spray 1 spray into each nostril daily, Starting Fri 12/1/2023, Normal      hydroCHLOROthiazide 12.5 mg tablet Take 1 tablet (12.5 mg total) by mouth daily, Starting Fri 7/19/2024, Normal      medroxyPROGESTERone (PROVERA) 10 mg tablet Take 1 tablet (10 mg total) by mouth daily Take 1 tablet daily for 10 days to induce a menstrual cycle every other month, Starting Tue 3/19/2024, Normal      Multiple Vitamins-Minerals (MULTIVITAMIN ADULTS PO) Take 1 tablet by mouth daily, Historical Med           No discharge procedures on file.  ED SEPSIS DOCUMENTATION   Time reflects when diagnosis was documented in both MDM as applicable and the Disposition within this note       Time User Action Codes Description Comment    10/6/2024  2:21 PM Darnell Galindo [M62.830] Paraspinal muscle spasm     10/6/2024  2:21 PM Darnell Galindo [B37.9] Yeast infection                  Darnell Galindo MD  10/13/24 0532

## 2024-11-14 DIAGNOSIS — Z00.6 ENCOUNTER FOR EXAMINATION FOR NORMAL COMPARISON OR CONTROL IN CLINICAL RESEARCH PROGRAM: ICD-10-CM

## 2024-11-16 ENCOUNTER — APPOINTMENT (OUTPATIENT)
Dept: LAB | Facility: IMAGING CENTER | Age: 34
End: 2024-11-16

## 2024-11-16 DIAGNOSIS — Z00.6 ENCOUNTER FOR EXAMINATION FOR NORMAL COMPARISON OR CONTROL IN CLINICAL RESEARCH PROGRAM: ICD-10-CM

## 2024-11-16 PROCEDURE — 36415 COLL VENOUS BLD VENIPUNCTURE: CPT

## 2024-11-29 LAB
APOB+LDLR+PCSK9 GENE MUT ANL BLD/T: NOT DETECTED
BRCA1+BRCA2 DEL+DUP + FULL MUT ANL BLD/T: NOT DETECTED
MLH1+MSH2+MSH6+PMS2 GN DEL+DUP+FUL M: NOT DETECTED